# Patient Record
Sex: MALE | Race: WHITE | Employment: FULL TIME | URBAN - METROPOLITAN AREA
[De-identification: names, ages, dates, MRNs, and addresses within clinical notes are randomized per-mention and may not be internally consistent; named-entity substitution may affect disease eponyms.]

---

## 2017-01-27 ENCOUNTER — OFFICE VISIT (OUTPATIENT)
Dept: PULMONOLOGY | Age: 42
End: 2017-01-27

## 2017-01-27 VITALS
TEMPERATURE: 97.5 F | HEART RATE: 85 BPM | WEIGHT: 286.6 LBS | OXYGEN SATURATION: 97 % | BODY MASS INDEX: 42.45 KG/M2 | HEIGHT: 69 IN | RESPIRATION RATE: 16 BRPM | SYSTOLIC BLOOD PRESSURE: 128 MMHG | DIASTOLIC BLOOD PRESSURE: 82 MMHG

## 2017-01-27 DIAGNOSIS — E66.01 OBESITY, CLASS III, BMI 40-49.9 (MORBID OBESITY) (HCC): ICD-10-CM

## 2017-01-27 DIAGNOSIS — R09.81 SINUS CONGESTION: ICD-10-CM

## 2017-01-27 DIAGNOSIS — Z99.89 OSA ON CPAP: Primary | ICD-10-CM

## 2017-01-27 DIAGNOSIS — G47.33 OSA ON CPAP: Primary | ICD-10-CM

## 2017-01-27 PROBLEM — E66.813 OBESITY, CLASS III, BMI 40-49.9 (MORBID OBESITY): Status: ACTIVE | Noted: 2017-01-27

## 2017-01-27 PROCEDURE — 99213 OFFICE O/P EST LOW 20 MIN: CPT | Performed by: NURSE PRACTITIONER

## 2017-01-27 RX ORDER — RANITIDINE 150 MG/1
150 CAPSULE ORAL 2 TIMES DAILY
COMMUNITY
End: 2020-09-16

## 2017-01-27 ASSESSMENT — SLEEP AND FATIGUE QUESTIONNAIRES
HOW LIKELY ARE YOU TO NOD OFF OR FALL ASLEEP WHILE SITTING AND READING: 1
HOW LIKELY ARE YOU TO NOD OFF OR FALL ASLEEP WHILE WATCHING TV: 1
ESS TOTAL SCORE: 11
HOW LIKELY ARE YOU TO NOD OFF OR FALL ASLEEP WHEN YOU ARE A PASSENGER IN A CAR FOR AN HOUR WITHOUT A BREAK: 2
HOW LIKELY ARE YOU TO NOD OFF OR FALL ASLEEP WHILE SITTING INACTIVE IN A PUBLIC PLACE: 2
NECK CIRCUMFERENCE (INCHES): 17.5
HOW LIKELY ARE YOU TO NOD OFF OR FALL ASLEEP IN A CAR, WHILE STOPPED FOR A FEW MINUTES IN TRAFFIC: 0
HOW LIKELY ARE YOU TO NOD OFF OR FALL ASLEEP WHILE LYING DOWN TO REST IN THE AFTERNOON WHEN CIRCUMSTANCES PERMIT: 3
HOW LIKELY ARE YOU TO NOD OFF OR FALL ASLEEP WHILE SITTING AND TALKING TO SOMEONE: 0
HOW LIKELY ARE YOU TO NOD OFF OR FALL ASLEEP WHILE SITTING QUIETLY AFTER LUNCH WITHOUT ALCOHOL: 2

## 2017-07-24 ENCOUNTER — OFFICE VISIT (OUTPATIENT)
Dept: PULMONOLOGY | Age: 42
End: 2017-07-24

## 2017-07-24 VITALS
TEMPERATURE: 97.9 F | OXYGEN SATURATION: 97 % | HEART RATE: 78 BPM | DIASTOLIC BLOOD PRESSURE: 72 MMHG | RESPIRATION RATE: 20 BRPM | SYSTOLIC BLOOD PRESSURE: 130 MMHG | HEIGHT: 69 IN | BODY MASS INDEX: 44.28 KG/M2 | WEIGHT: 299 LBS

## 2017-07-24 DIAGNOSIS — Z99.89 OSA ON CPAP: Primary | ICD-10-CM

## 2017-07-24 DIAGNOSIS — G47.33 OSA ON CPAP: Primary | ICD-10-CM

## 2017-07-24 DIAGNOSIS — E66.01 OBESITY, CLASS III, BMI 40-49.9 (MORBID OBESITY) (HCC): ICD-10-CM

## 2017-07-24 PROCEDURE — 99213 OFFICE O/P EST LOW 20 MIN: CPT | Performed by: NURSE PRACTITIONER

## 2017-07-24 ASSESSMENT — SLEEP AND FATIGUE QUESTIONNAIRES
HOW LIKELY ARE YOU TO NOD OFF OR FALL ASLEEP WHILE SITTING AND READING: 2
HOW LIKELY ARE YOU TO NOD OFF OR FALL ASLEEP IN A CAR, WHILE STOPPED FOR A FEW MINUTES IN TRAFFIC: 0
HOW LIKELY ARE YOU TO NOD OFF OR FALL ASLEEP WHILE WATCHING TV: 2
NECK CIRCUMFERENCE (INCHES): 17.5
HOW LIKELY ARE YOU TO NOD OFF OR FALL ASLEEP WHILE LYING DOWN TO REST IN THE AFTERNOON WHEN CIRCUMSTANCES PERMIT: 3
HOW LIKELY ARE YOU TO NOD OFF OR FALL ASLEEP WHILE SITTING QUIETLY AFTER LUNCH WITHOUT ALCOHOL: 0
HOW LIKELY ARE YOU TO NOD OFF OR FALL ASLEEP WHEN YOU ARE A PASSENGER IN A CAR FOR AN HOUR WITHOUT A BREAK: 2
HOW LIKELY ARE YOU TO NOD OFF OR FALL ASLEEP WHILE SITTING AND TALKING TO SOMEONE: 0
HOW LIKELY ARE YOU TO NOD OFF OR FALL ASLEEP WHILE SITTING INACTIVE IN A PUBLIC PLACE: 0
ESS TOTAL SCORE: 9

## 2018-07-30 ENCOUNTER — OFFICE VISIT (OUTPATIENT)
Dept: PULMONOLOGY | Age: 43
End: 2018-07-30

## 2018-07-30 VITALS
OXYGEN SATURATION: 98 % | WEIGHT: 309 LBS | DIASTOLIC BLOOD PRESSURE: 76 MMHG | TEMPERATURE: 98 F | HEIGHT: 69 IN | SYSTOLIC BLOOD PRESSURE: 134 MMHG | BODY MASS INDEX: 45.77 KG/M2 | RESPIRATION RATE: 16 BRPM | HEART RATE: 75 BPM

## 2018-07-30 DIAGNOSIS — G47.33 OSA ON CPAP: Primary | ICD-10-CM

## 2018-07-30 DIAGNOSIS — Z99.89 OSA ON CPAP: Primary | ICD-10-CM

## 2018-07-30 DIAGNOSIS — I49.3 PVC'S (PREMATURE VENTRICULAR CONTRACTIONS): ICD-10-CM

## 2018-07-30 DIAGNOSIS — E66.01 OBESITY, CLASS III, BMI 40-49.9 (MORBID OBESITY) (HCC): ICD-10-CM

## 2018-07-30 PROCEDURE — 99213 OFFICE O/P EST LOW 20 MIN: CPT | Performed by: NURSE PRACTITIONER

## 2018-07-30 ASSESSMENT — SLEEP AND FATIGUE QUESTIONNAIRES
ESS TOTAL SCORE: 11
HOW LIKELY ARE YOU TO NOD OFF OR FALL ASLEEP WHILE SITTING INACTIVE IN A PUBLIC PLACE: 1
HOW LIKELY ARE YOU TO NOD OFF OR FALL ASLEEP IN A CAR, WHILE STOPPED FOR A FEW MINUTES IN TRAFFIC: 0
HOW LIKELY ARE YOU TO NOD OFF OR FALL ASLEEP WHILE LYING DOWN TO REST IN THE AFTERNOON WHEN CIRCUMSTANCES PERMIT: 3
HOW LIKELY ARE YOU TO NOD OFF OR FALL ASLEEP WHEN YOU ARE A PASSENGER IN A CAR FOR AN HOUR WITHOUT A BREAK: 2
NECK CIRCUMFERENCE (INCHES): 18.5
HOW LIKELY ARE YOU TO NOD OFF OR FALL ASLEEP WHILE SITTING AND READING: 2
ESS TOTAL SCORE: 14
HOW LIKELY ARE YOU TO NOD OFF OR FALL ASLEEP WHILE SITTING AND TALKING TO SOMEONE: 1
HOW LIKELY ARE YOU TO NOD OFF OR FALL ASLEEP WHILE SITTING INACTIVE IN A PUBLIC PLACE: 0
HOW LIKELY ARE YOU TO NOD OFF OR FALL ASLEEP WHILE SITTING QUIETLY AFTER LUNCH WITHOUT ALCOHOL: 2
HOW LIKELY ARE YOU TO NOD OFF OR FALL ASLEEP WHILE SITTING AND READING: 2
HOW LIKELY ARE YOU TO NOD OFF OR FALL ASLEEP WHILE WATCHING TV: 2
HOW LIKELY ARE YOU TO NOD OFF OR FALL ASLEEP WHILE SITTING AND TALKING TO SOMEONE: 0
HOW LIKELY ARE YOU TO NOD OFF OR FALL ASLEEP IN A CAR, WHILE STOPPED FOR A FEW MINUTES IN TRAFFIC: 1
HOW LIKELY ARE YOU TO NOD OFF OR FALL ASLEEP WHILE LYING DOWN TO REST IN THE AFTERNOON WHEN CIRCUMSTANCES PERMIT: 3
HOW LIKELY ARE YOU TO NOD OFF OR FALL ASLEEP WHILE SITTING QUIETLY AFTER LUNCH WITHOUT ALCOHOL: 2
HOW LIKELY ARE YOU TO NOD OFF OR FALL ASLEEP WHEN YOU ARE A PASSENGER IN A CAR FOR AN HOUR WITHOUT A BREAK: 2
HOW LIKELY ARE YOU TO NOD OFF OR FALL ASLEEP WHILE WATCHING TV: 2

## 2018-07-30 NOTE — PATIENT INSTRUCTIONS
Uncomfortable bedding can prevent good sleep. Ensure your bedroom is quiet and comfortable. A cooler room along with enough blankets to stay warm is recommended. If your room is too noisy, try a white noise machine. If too bright, try black out shades or an eye mask. Dont take worries to bed. Leave worries about work, school etc. behind you when you go to bed. Some people find it helpful to assign a worry period in the evening or late afternoon to write down your worries and get them out of your system. CPAP Equipment Cleaning and Disinfecting Schedule  Equipment Cleaning Frequency Instructions  Disinfecting Frequency   Non-Disposable Filters  Weekly Mild soapy water, Rinse, Air Dry Not Required   Disposable Filters Change as needed  2-4 weeks Do Not Wash Not Required   Hose/tubing Daily Mild soapy water, Rinse, Air Dry Once a week   Mask / Nasal Pillows Daily Mild soapy water, Rinse, Air Dry Once a week   Headgear Weekly Hand wash, Mild soapy water, Rinse, Dry  Not Required   Humidifier Daily Empty water daily  Mild soapy water, Rinse well, Air Dry  Once a week   CPAP Unit As Needed Dust with damp cloth,  No detergents or sprays Not Required         Disinfect (per schedule) with 1 part white vinegar and 3 parts water- soak mask and water chamber for 30 minutes every 1-2 weeks, more often if sick. Allow water/vinegar mixture to run through tubing. Allow all equipment to air dry. Drying Hints:   Always hang tubing away from direct sunlight, as this will cause the tubing to become yellow, brittle and crack over a period of time. DO NOT attach the wet tubing to your CPAP unit to blow-dry it. The moisture from the tubing can drain back into your machine. Moisture in your unit can cause sudden pressure increases or short circuits  DO's and DON'Ts:  - Don't use alcohol-based products to clean your mask, because it can cause the materials to become hard and brittle.    - Don't put headgear in the washer or varenicline and you have any new or worsening depressed mood or thoughts of harming yourself or someone else, stop taking the medicine and call your doctor. Buproprion should not be taken by anyone with a history of seizure or epilepsy. Will I gain weight if I quit?  Yes, you might gain a few pounds. But quitting smoking will have a much more positive effect on your health than weighing a few pounds more. Plus, you can help prevent some weight gain by being more active and eating less. Taking the medicine bupropion might help control weight gain. What else can I do to improve my chances of quitting?  You can:  ?Start exercising. ?Stay away from smokers and places that you associate with smoking. If people close to you smoke, ask them to quit with you. ? Keep gum, hard candy, or something to put in your mouth handy. If you get a craving for a cigarette, try one of these instead. ?Dont give up, even if you start smoking again. It takes most people a few tries before they succeed. You can also get help from a free phone line (4-008-QUIT-NOW) or go online to www.smokefree.gov. Your pulmonary team is here to help you quit.   Call for assistance [phone]

## 2018-07-30 NOTE — PROGRESS NOTES
MA Communication: The following orders are received by verbal communication from JULIETTE Ash.     Orders include:  Order faxed to [facility]       Cardiology referral given       1 year fu scheduled 7/29/19

## 2019-07-29 ENCOUNTER — TELEPHONE (OUTPATIENT)
Dept: PULMONOLOGY | Age: 44
End: 2019-07-29

## 2019-08-12 ENCOUNTER — OFFICE VISIT (OUTPATIENT)
Dept: PULMONOLOGY | Age: 44
End: 2019-08-12
Payer: COMMERCIAL

## 2019-08-12 VITALS
BODY MASS INDEX: 41.67 KG/M2 | RESPIRATION RATE: 16 BRPM | HEIGHT: 73 IN | HEART RATE: 98 BPM | TEMPERATURE: 97.7 F | OXYGEN SATURATION: 97 % | SYSTOLIC BLOOD PRESSURE: 135 MMHG | DIASTOLIC BLOOD PRESSURE: 76 MMHG | WEIGHT: 314.4 LBS

## 2019-08-12 DIAGNOSIS — E66.01 OBESITY, CLASS III, BMI 40-49.9 (MORBID OBESITY) (HCC): ICD-10-CM

## 2019-08-12 DIAGNOSIS — Z99.89 OSA ON CPAP: Primary | ICD-10-CM

## 2019-08-12 DIAGNOSIS — Z71.89 CPAP USE COUNSELING: ICD-10-CM

## 2019-08-12 DIAGNOSIS — G47.33 OSA ON CPAP: Primary | ICD-10-CM

## 2019-08-12 PROCEDURE — 4004F PT TOBACCO SCREEN RCVD TLK: CPT | Performed by: NURSE PRACTITIONER

## 2019-08-12 PROCEDURE — 99213 OFFICE O/P EST LOW 20 MIN: CPT | Performed by: NURSE PRACTITIONER

## 2019-08-12 PROCEDURE — G8417 CALC BMI ABV UP PARAM F/U: HCPCS | Performed by: NURSE PRACTITIONER

## 2019-08-12 PROCEDURE — G8427 DOCREV CUR MEDS BY ELIG CLIN: HCPCS | Performed by: NURSE PRACTITIONER

## 2019-08-12 ASSESSMENT — SLEEP AND FATIGUE QUESTIONNAIRES
HOW LIKELY ARE YOU TO NOD OFF OR FALL ASLEEP IN A CAR, WHILE STOPPED FOR A FEW MINUTES IN TRAFFIC: 0
HOW LIKELY ARE YOU TO NOD OFF OR FALL ASLEEP WHILE LYING DOWN TO REST IN THE AFTERNOON WHEN CIRCUMSTANCES PERMIT: 2
HOW LIKELY ARE YOU TO NOD OFF OR FALL ASLEEP WHILE SITTING AND TALKING TO SOMEONE: 0
HOW LIKELY ARE YOU TO NOD OFF OR FALL ASLEEP WHILE SITTING INACTIVE IN A PUBLIC PLACE: 0
HOW LIKELY ARE YOU TO NOD OFF OR FALL ASLEEP WHILE SITTING AND READING: 1
HOW LIKELY ARE YOU TO NOD OFF OR FALL ASLEEP WHILE SITTING QUIETLY AFTER LUNCH WITHOUT ALCOHOL: 0
HOW LIKELY ARE YOU TO NOD OFF OR FALL ASLEEP WHILE WATCHING TV: 2
HOW LIKELY ARE YOU TO NOD OFF OR FALL ASLEEP WHEN YOU ARE A PASSENGER IN A CAR FOR AN HOUR WITHOUT A BREAK: 0
NECK CIRCUMFERENCE (INCHES): 19.5
ESS TOTAL SCORE: 5

## 2019-08-12 NOTE — PROGRESS NOTES
(PRINIVIL;ZESTRIL) 20 MG tablet, Take 1 tablet by mouth daily (Patient taking differently: Take 10 mg by mouth daily ), Disp: 90 tablet, Rfl: 0    metoprolol (LOPRESSOR) 25 MG tablet, Take 1 tablet by mouth 2 times daily. (Patient taking differently: Take 12.5 mg by mouth 2 times daily ), Disp: 180 tablet, Rfl: 1    naproxen (NAPROSYN) 375 MG tablet, Take 1 tablet by mouth 2 times daily for 10 days, Disp: 20 tablet, Rfl: 0      Objective:   PHYSICAL EXAM:    Blood pressure 135/76, pulse 98, temperature 97.7 °F (36.5 °C), temperature source Oral, resp. rate 16, height 6' 1\" (1.854 m), weight (!) 314 lb 6.4 oz (142.6 kg), SpO2 97 %.' on RA  Gen: No acute distress. Obese. BMI of 41.48  Eyes: PERRL. No sclera icterus. No conjunctival injection. ENT: No discharge. Pharynx clear. Mallampati class IV. Large tongue, tonsillar hypertrophy  Neck: Trachea midline. No obvious mass. Neck circumference 19.5\"  Resp: No accessory muscle use. No crackles. No wheezes. No rhonchi. CV: Regular rate. Regular rhythm. No murmur or rub. Skin: Warm and dry. M/S: No cyanosis. No obvious joint deformity. Neuro: Awake. Alert. Moves all four extremities. Psych: Oriented x 3. No anxiety. DATA:   PSG 5/1/16- AHI 86.7, low spo2 74%, EKG NSR with PVCs  CPAP titration 5/23/16- Improved sleep related breathing with CPAP, rec CPAP 10 cm H2O, EKG with PVCs    CPAP compliance data:  Compliance download report from 6/24/17 to 7/23/17 showed patient is using machine 6:27 hrs/night with 90% compliance and AHI 0.6 within this time frame. 27/30days with greater than 4 hours of machine use. CPAP 10 cm H20    Compliance download report from 6/30/18 to 7/29/18 showed patient is using machine 6:28 hrs/night with 96% compliance and AHI 0.7 within this time frame. 29/30days with greater than 4 hours of machine use.  CPAP 10 cm H20    Compliance download report from 7/13/19 to 8/11/19 reviewed today by me and showed patient is using machine 7:52 hrs/night with 100% compliance and AHI 0.7 within this time frame. 30/30days with greater than 4 hours of machine use. CPAP 10 cm H20    Assessment:       · Severe SAAD- CPAP 10 cm H2O- Optimal compliance and efficacy on review today. · Snoring- resolved on CPAP  · Witnessed apnea -resolved on CPAP  · Hypersomnia-improving on CPAP  · PVCs on PSG and titration EKGs- has not seen cardiology  · Seasonal allergies  · HTN      Plan:         · Continue CPAP 10 cm H2O  · Advised to use CPAP 7-8 hrs at night and during naps. · Discussed severity of SAAD and importance of CPAP use  · Send order for new supplies  · Replacement of mask, tubing, head straps every 3-6 months or sooner if damaged. · Patient instructed to contact [employer] company for any mask, tubing or machine trouble shooting if problems arise. · Sleep hygiene  · Avoid sedatives, alcohol and caffeinated drinks at bed time. · No driving motorized vehicles or operating heavy machinery while fatigue, drowsy or sleepy. · Weight loss is also recommended as a long-term intervention. · Complications of SAAD if not treated were discussed with patient patient to include systemic hypertension, pulmonary hypertension, cardiovascular morbidities, car accidents and all cause mortality. · Patient education handout provided regarding sleep tips and CPAP cleaning recommendations   · Please follow up with cardiology for EKG with PVCs on PSG and titration    Follow up 1 year sooner if needed.

## 2019-08-12 NOTE — PATIENT INSTRUCTIONS
awakening time, Even on weekends! You will feel better keeping a regular sleep cycle, even if you are retired or not working. Get into your favorite sleep position. If not asleep in 30 minutes, get up and do something boring until you feel sleepy. Remember not to expose yourself to bright lights such as TV, phone or tablet screens. Only use your bed for sleeping. Do not use your bed as an office, workroom or recreation room. Use comfortable bedding. Uncomfortable bedding can prevent good sleep. Ensure your bedroom is quiet and comfortable. A cooler room along with enough blankets to stay warm is recommended. If your room is too noisy, try a white noise machine. If too bright, try black out shades or an eye mask. Dont take worries to bed. Leave worries about work, school etc. behind you when you go to bed. Some people find it helpful to assign a worry period in the evening or late afternoon to write down your worries and get them out of your system. CPAP Equipment Cleaning and Disinfecting Schedule  Equipment Cleaning Frequency Instructions  Disinfecting Frequency   Non-Disposable Filters  Weekly Mild soapy water, Rinse, Air Dry Not Required   Disposable Filters Change as needed  2-4 weeks Do Not Wash Not Required   Hose/tubing Daily Mild soapy water, Rinse, Air Dry Once a week   Mask / Nasal Pillows Daily Mild soapy water, Rinse, Air Dry Once a week   Headgear Weekly Hand wash, Mild soapy water, Rinse, Dry  Not Required   Humidifier Daily Empty water daily  Mild soapy water, Rinse well, Air Dry  Once a week   CPAP Unit As Needed Dust with damp cloth,  No detergents or sprays Not Required         Disinfect (per schedule) with 1 part white vinegar and 3 parts water- soak mask and water chamber for 30 minutes every 1-2 weeks, more often if sick. Allow water/vinegar mixture to run through tubing. Allow all equipment to air dry.    Drying Hints:   Always hang tubing away from direct sunlight, as this

## 2020-08-17 ENCOUNTER — TELEPHONE (OUTPATIENT)
Dept: PULMONOLOGY | Age: 45
End: 2020-08-17

## 2020-08-24 ENCOUNTER — TELEPHONE (OUTPATIENT)
Dept: PULMONOLOGY | Age: 45
End: 2020-08-24

## 2020-08-24 NOTE — TELEPHONE ENCOUNTER

## 2020-08-29 ENCOUNTER — HOSPITAL ENCOUNTER (OUTPATIENT)
Dept: ULTRASOUND IMAGING | Age: 45
Discharge: HOME | End: 2020-08-29
Payer: COMMERCIAL

## 2020-08-29 PROCEDURE — 76705 ECHO EXAM OF ABDOMEN: CPT

## 2020-09-16 ENCOUNTER — TELEPHONE (OUTPATIENT)
Dept: PULMONOLOGY | Age: 45
End: 2020-09-16

## 2020-09-16 ENCOUNTER — VIRTUAL VISIT (OUTPATIENT)
Dept: PULMONOLOGY | Age: 45
End: 2020-09-16
Payer: COMMERCIAL

## 2020-09-16 PROCEDURE — 99213 OFFICE O/P EST LOW 20 MIN: CPT | Performed by: NURSE PRACTITIONER

## 2020-09-16 RX ORDER — DULAGLUTIDE 0.75 MG/.5ML
INJECTION, SOLUTION SUBCUTANEOUS
COMMUNITY
Start: 2020-09-01 | End: 2021-07-07

## 2020-09-16 RX ORDER — FAMOTIDINE 20 MG/1
20 TABLET, FILM COATED ORAL 2 TIMES DAILY
COMMUNITY

## 2020-09-16 ASSESSMENT — SLEEP AND FATIGUE QUESTIONNAIRES
ESS TOTAL SCORE: 9
HOW LIKELY ARE YOU TO NOD OFF OR FALL ASLEEP WHILE SITTING INACTIVE IN A PUBLIC PLACE: 0
HOW LIKELY ARE YOU TO NOD OFF OR FALL ASLEEP WHILE SITTING QUIETLY AFTER LUNCH WITHOUT ALCOHOL: 2
HOW LIKELY ARE YOU TO NOD OFF OR FALL ASLEEP WHILE SITTING AND TALKING TO SOMEONE: 0
HOW LIKELY ARE YOU TO NOD OFF OR FALL ASLEEP WHILE WATCHING TV: 2
HOW LIKELY ARE YOU TO NOD OFF OR FALL ASLEEP WHEN YOU ARE A PASSENGER IN A CAR FOR AN HOUR WITHOUT A BREAK: 0
HOW LIKELY ARE YOU TO NOD OFF OR FALL ASLEEP WHILE SITTING AND READING: 2
HOW LIKELY ARE YOU TO NOD OFF OR FALL ASLEEP WHILE LYING DOWN TO REST IN THE AFTERNOON WHEN CIRCUMSTANCES PERMIT: 3
HOW LIKELY ARE YOU TO NOD OFF OR FALL ASLEEP IN A CAR, WHILE STOPPED FOR A FEW MINUTES IN TRAFFIC: 0

## 2020-09-16 NOTE — PATIENT INSTRUCTIONS
Here are some tips to to getting better sleep  1- Avoid napping during the day: This will ensure you are tired at bedtime. If you have to take a nap, sleep less than one hour, before 3 pm.   2- Exercise regularly, but not right before bed: but the timing of the workout is important. Exercising in the morning or early afternoon will not interfere with sleep. Exercising within two hours before bedtime can decrease your ability to fall asleep. Regular exercise is recommended to help you deepen the sleep. 3- Avoid heavy, spicy, or sugary foods 4-6 hours before bedtime: These can affect your ability to stay asleep. 4- Have a light snack before bed: Having an empty stomach can interfere with your sleep. Dairy products and turkey contain tryptophan, which acts as a natural sleep inducer. 5- Stay away from caffeine, nicotine and alcohol at least 4-6 hours before bed: Caffeine and nicotine are stimulants that interfere with your ability to fall asleep. While alcohol has an immediate sleep-inducing effect, a few hours later, as alcohol levels in your blood start to fall, there is a stimulant effect and you will experience fragmented sleep. 6- Take a hot bath 90 minutes before bedtime:  A hot bath will raise your body temperature, but it is the drop in body temperature that may leave you feeling sleepy  7- Develop sleep rituals: it is important to give your body cues that it is time to slow down and sleep. Listen to relaxing music, read something soothing for 15 minutes, have a cup of caffeine free tea, or do relaxation exercises such as yoga or deep breathing help relieve anxiety and reduce muscle tension. 8- Fix a bedtime and an awakening time: Even on weekends! When your sleep cycle has a regular rhythm, you will feel better. 9- Sleep only when sleepy: This reduces the time you are awake in bed.    10- Get into your favorite sleeping position: If you can't fall asleep within 15-30 minutes, get up and do chamber for 30 minutes every 1-2 weeks, more often if sick. Allow water/vinegar mixture to run through tubing. Allow all equipment to air dry. Drying Hints:   Always hang tubing away from direct sunlight, as this will cause the tubing to become yellow, brittle and crack over a period of time. DO NOT attach the wet tubing to your CPAP unit to blow-dry it. The moisture from the tubing can drain back into your machine. Moisture in your unit can cause sudden pressure increases or short circuits  DO's and DON'Ts:  - Don't use alcohol-based products to clean your mask, because it can cause the materials to become hard and brittle. - Don't put headgear in the washer or dryer  - Don't use any caustic or household cleaning solutions such as bleach on your CPAP   equipment.  - Do follow the recommended cleaning schedule. - Do change your disposable filter frequently. Adapted From: [employer]/cleaning. shtm.   These are general suggestions for all models please follow specific s recommendations and specific instructions

## 2020-09-16 NOTE — PROGRESS NOTES
Orders verbalized by Natalie Sims CNP;  1 yr  Orders faxed to DME:[facility] (Pt taking machine to [facility] for download)  Phone note

## 2020-09-16 NOTE — PROGRESS NOTES
CHIEF COMPLAINT: Sleep apnea follow up  Consulting provider: Dr. Chadwick Powers is a 45 y.o. male for televideo appointment via video and audio doxy. me virtual visit for SAAD follow up. STates he is doing okay with CPAP. Has been a little more tired lately but states also just diagnosed with diabetes and getting medications figured out. Patient is using CPAP 8 hrs/night. Using humidifier. No snoring on CPAP. The pressure is well tolerated. The mask is comfortable-full face arely view. No mask leak. Some daytime sleepiness. No nodding off when driving. Rare dry nose or throat. Some fatigue. Bedtime is 8-9 pm and rise time is 430 am. Sleep onset is few-10 minutes. Wakes up 1-2 times at night total. 1-2 nocturia. It takes few minutes to fall back a sleep. Occasional naps during the day. No headache in am. No weight gain. 1 caffienated beverages during the day. No alcohol. ESS is 9. Past Medical History:   Diagnosis Date    Hypertension     Kidney stones     MRSA (methicillin resistant Staphylococcus aureus) 12/10/12    SAAD (obstructive sleep apnea)        Past Surgical History:        Procedure Laterality Date    OTHER SURGICAL HISTORY  12/13/12    Wide Excision of Nonhealing Cysts Right Axilla and Bilateral Upper Inner Thighs    OTHER SURGICAL HISTORY  09/25/2013    cystoscopy, right retrograde, ureteroscopy, Holmium Laser lithotripsy, stone extraction       Allergies:  is allergic to amoxicillin; erythromycin; and pcn [penicillins]. Social History:    TOBACCO:   reports that he has been smoking cigarettes. He has a 10.00 pack-year smoking history. He has quit using smokeless tobacco.  His smokeless tobacco use included chew. ETOH:   reports current alcohol use.       Family History:       Problem Relation Age of Onset    Diabetes Mother     Asthma Father     COPD Father        Current Medications:    Current Outpatient Medications:     famotidine (PEPCID) 20 MG tablet, Take 20 mg by mouth 2 times daily, Disp: , Rfl:     TRULICITY 0.00 OM/7.9YJ SOPN, , Disp: , Rfl:     metFORMIN (GLUCOPHAGE) 1000 MG tablet, , Disp: , Rfl:     lisinopril (PRINIVIL;ZESTRIL) 20 MG tablet, Take 1 tablet by mouth daily (Patient taking differently: Take 10 mg by mouth daily ), Disp: 90 tablet, Rfl: 0    metoprolol (LOPRESSOR) 25 MG tablet, Take 1 tablet by mouth 2 times daily. (Patient taking differently: Take 12.5 mg by mouth 2 times daily ), Disp: 180 tablet, Rfl: 1    naproxen (NAPROSYN) 375 MG tablet, Take 1 tablet by mouth 2 times daily for 10 days, Disp: 20 tablet, Rfl: 0      Objective:   PHYSICAL EXAM:    There were no vitals taken for this visit.' on RA  Exam:  Gen: No acute distress, does not appear to be in pain. Appears well developed and nourished. HENT: Head is normocephalic and atraumatic. Normal appearing nose. External Ears normal.   Neck: No visualized mass. Trachea is midline   Eyes: EOM intact. No visible discharge. Resp:No visualized signs of difficulty breathing or respiratory distress, speaking in full sentences. Respiratory effort normal.  Neuro: Awake. Alert. Able to follow commands. No facial asymmetry. Psych: Oriented x 3. No anxiety. Normal affect. DATA:   PSG 5/1/16- AHI 86.7, low spo2 74%, EKG NSR with PVCs  CPAP titration 5/23/16- Improved sleep related breathing with CPAP, rec CPAP 10 cm H2O, EKG with PVCs    CPAP compliance data:  Compliance download report from 6/24/17 to 7/23/17 showed patient is using machine 6:27 hrs/night with 90% compliance and AHI 0.6 within this time frame. 27/30days with greater than 4 hours of machine use. CPAP 10 cm H20    Compliance download report from 6/30/18 to 7/29/18 showed patient is using machine 6:28 hrs/night with 96% compliance and AHI 0.7 within this time frame. 29/30days with greater than 4 hours of machine use.  CPAP 10 cm H20    Compliance download report from 7/13/19 to 8/11/19 reviewed today by me and showed patient is using machine 7:52 hrs/night with 100% compliance and AHI 0.7 within this time frame. 30/30days with greater than 4 hours of machine use. CPAP 10 cm H20    9/16/2020-unable to obtain CPAP download report    Assessment:       · Severe SAAD- CPAP 10 cm H2O-compliant per patient  · Snoring- resolved on CPAP  · Witnessed apnea -resolved on CPAP  · Hypersomnia-improving on CPAP, a little worse recently  · Seasonal allergies  · HTN      Plan:         · Continue CPAP 10 cm H2O for now, review report- could consider increase in pressure if needed as patient has had some increased fatigue recently. · Request CPAP download report from DME. · Advised to use CPAP 7-8 hrs at night and during naps. · Discussed severity of SAAD and importance of CPAP use  · Send order for new supplies  · Replacement of mask, tubing, head straps every 3-6 months or sooner if damaged. · Patient instructed to contact DME company for any mask, tubing or machine trouble shooting if problems arise. · Sleep hygiene  · Avoid sedatives, alcohol and caffeinated drinks at bed time. · No driving motorized vehicles or operating heavy machinery while fatigue, drowsy or sleepy. · Weight loss is also recommended as a long-term intervention. · Complications of SAAD if not treated were discussed with patient patient to include systemic hypertension, pulmonary hypertension, cardiovascular morbidities, car accidents and all cause mortality. · Patient education handout provided regarding sleep tips and CPAP cleaning recommendations     Follow up 1 year sooner if needed. Consent for telehealth visit was obtained and is noted in chart      THIS VISIT WAS COMPLETED VIRTUALLY USING DOXY. Jory Perdomo is a 45 y.o. male being evaluated by a Virtual Visit (video visit) encounter to address concerns as mentioned above. A caregiver was present when appropriate.  Due to this being a TeleHealth encounter (During ITPJX-56 public health emergency), evaluation of the following organ systems was limited: Vitals/Constitutional/EENT/Resp/CV/GI//MS/Neuro/Skin/Heme-Lymph-Imm. Pursuant to the emergency declaration under the [address] [place] [address], [address] [place] [address] authority and the [employer] and [employer] Act, this Virtual Visit was conducted with patient's (and/or legal guardian's) consent, to reduce the patient's risk of exposure to COVID-19 and provide necessary medical care. The patient (and/or legal guardian) has also been advised to contact this office for worsening conditions or problems, and seek emergency medical treatment and/or call 911 if deemed necessary. Patient identification was verified at the start of the visit: Yes    Total time spent for this encounter: Not billed by time    Services were provided through a video synchronous discussion virtually to substitute for in-person clinic visit. Patient and provider were located at their individual homes. --RAJINDER Felix CNP on 9/16/2020 at 11:48 AM    An electronic signature was used to authenticate this note.

## 2020-09-17 ENCOUNTER — APPOINTMENT (OUTPATIENT)
Dept: CT IMAGING | Age: 45
End: 2020-09-17
Payer: COMMERCIAL

## 2020-09-17 ENCOUNTER — HOSPITAL ENCOUNTER (EMERGENCY)
Age: 45
Discharge: HOME | End: 2020-09-18
Payer: COMMERCIAL

## 2020-09-17 LAB
A/G RATIO: 2 (ref 1.1–2.2)
ALBUMIN SERPL-MCNC: 4.5 G/DL (ref 3.4–5)
ALP BLD-CCNC: 89 U/L (ref 40–129)
ALT SERPL-CCNC: 53 U/L (ref 10–40)
ANION GAP SERPL CALCULATED.3IONS-SCNC: 12 MMOL/L (ref 3–16)
AST SERPL-CCNC: 25 U/L (ref 15–37)
BASOPHILS ABSOLUTE: 0.1 K/UL (ref 0–0.2)
BASOPHILS RELATIVE PERCENT: 0.7 %
BILIRUB SERPL-MCNC: 0.3 MG/DL (ref 0–1)
BUN BLDV-MCNC: 10 MG/DL (ref 7–20)
CALCIUM SERPL-MCNC: 9.3 MG/DL (ref 8.3–10.6)
CHLORIDE BLD-SCNC: 102 MMOL/L (ref 99–110)
CO2: 23 MMOL/L (ref 21–32)
CREAT SERPL-MCNC: 0.9 MG/DL (ref 0.9–1.3)
EOSINOPHILS ABSOLUTE: 0.2 K/UL (ref 0–0.6)
EOSINOPHILS RELATIVE PERCENT: 1.4 %
GFR AFRICAN AMERICAN: >60
GFR NON-AFRICAN AMERICAN: >60
GLOBULIN: 2.2 G/DL
GLUCOSE BLD-MCNC: 183 MG/DL (ref 70–99)
HCT VFR BLD CALC: 42.2 % (ref 40.5–52.5)
HEMOGLOBIN: 14 G/DL (ref 13.5–17.5)
LYMPHOCYTES ABSOLUTE: 2.3 K/UL (ref 1–5.1)
LYMPHOCYTES RELATIVE PERCENT: 16.7 %
MCH RBC QN AUTO: 29.3 PG (ref 26–34)
MCHC RBC AUTO-ENTMCNC: 33.2 G/DL (ref 31–36)
MCV RBC AUTO: 88.1 FL (ref 80–100)
MONOCYTES ABSOLUTE: 0.6 K/UL (ref 0–1.3)
MONOCYTES RELATIVE PERCENT: 4.4 %
NEUTROPHILS ABSOLUTE: 10.4 K/UL (ref 1.7–7.7)
NEUTROPHILS RELATIVE PERCENT: 76.8 %
PDW BLD-RTO: 13.9 % (ref 12.4–15.4)
PLATELET # BLD: 355 K/UL (ref 135–450)
PMV BLD AUTO: 7.6 FL (ref 5–10.5)
POTASSIUM REFLEX MAGNESIUM: 4 MMOL/L (ref 3.5–5.1)
RBC # BLD: 4.79 M/UL (ref 4.2–5.9)
SODIUM BLD-SCNC: 137 MMOL/L (ref 136–145)
TOTAL PROTEIN: 6.7 G/DL (ref 6.4–8.2)
WBC # BLD: 13.6 K/UL (ref 4–11)

## 2020-09-17 PROCEDURE — 96375 TX/PRO/DX INJ NEW DRUG ADDON: CPT

## 2020-09-17 PROCEDURE — 99284 EMERGENCY DEPT VISIT MOD MDM: CPT

## 2020-09-17 PROCEDURE — 2580000003 HC RX 258: Performed by: PHYSICIAN ASSISTANT

## 2020-09-17 PROCEDURE — 96374 THER/PROPH/DIAG INJ IV PUSH: CPT

## 2020-09-17 PROCEDURE — 74176 CT ABD & PELVIS W/O CONTRAST: CPT

## 2020-09-17 PROCEDURE — 87086 URINE CULTURE/COLONY COUNT: CPT

## 2020-09-17 PROCEDURE — 6360000002 HC RX W HCPCS: Performed by: PHYSICIAN ASSISTANT

## 2020-09-17 PROCEDURE — 85025 COMPLETE CBC W/AUTO DIFF WBC: CPT

## 2020-09-17 PROCEDURE — 80053 COMPREHEN METABOLIC PANEL: CPT

## 2020-09-17 PROCEDURE — 81001 URINALYSIS AUTO W/SCOPE: CPT

## 2020-09-17 RX ORDER — KETOROLAC TROMETHAMINE 30 MG/ML
15 INJECTION, SOLUTION INTRAMUSCULAR; INTRAVENOUS ONCE
Status: COMPLETED | OUTPATIENT
Start: 2020-09-17 | End: 2020-09-17

## 2020-09-17 RX ORDER — 0.9 % SODIUM CHLORIDE 0.9 %
1000 INTRAVENOUS SOLUTION INTRAVENOUS ONCE
Status: COMPLETED | OUTPATIENT
Start: 2020-09-17 | End: 2020-09-18

## 2020-09-17 RX ORDER — ONDANSETRON 2 MG/ML
4 INJECTION INTRAMUSCULAR; INTRAVENOUS ONCE
Status: COMPLETED | OUTPATIENT
Start: 2020-09-17 | End: 2020-09-17

## 2020-09-17 RX ADMIN — SODIUM CHLORIDE 1000 ML: 9 INJECTION, SOLUTION INTRAVENOUS at 23:43

## 2020-09-17 RX ADMIN — ONDANSETRON HYDROCHLORIDE 4 MG: 2 INJECTION, SOLUTION INTRAMUSCULAR; INTRAVENOUS at 23:44

## 2020-09-17 RX ADMIN — KETOROLAC TROMETHAMINE 15 MG: 30 INJECTION, SOLUTION INTRAMUSCULAR at 23:43

## 2020-09-17 ASSESSMENT — PAIN DESCRIPTION - DESCRIPTORS: DESCRIPTORS: SHARP

## 2020-09-17 ASSESSMENT — PAIN DESCRIPTION - FREQUENCY: FREQUENCY: INTERMITTENT

## 2020-09-17 ASSESSMENT — PAIN SCALES - GENERAL
PAINLEVEL_OUTOF10: 6
PAINLEVEL_OUTOF10: 6

## 2020-09-17 ASSESSMENT — PAIN DESCRIPTION - LOCATION: LOCATION: FLANK

## 2020-09-17 ASSESSMENT — PAIN DESCRIPTION - PAIN TYPE: TYPE: ACUTE PAIN

## 2020-09-17 ASSESSMENT — PAIN DESCRIPTION - ORIENTATION: ORIENTATION: RIGHT

## 2020-09-18 VITALS
DIASTOLIC BLOOD PRESSURE: 75 MMHG | OXYGEN SATURATION: 99 % | TEMPERATURE: 97.8 F | HEART RATE: 82 BPM | WEIGHT: 295 LBS | BODY MASS INDEX: 42.23 KG/M2 | SYSTOLIC BLOOD PRESSURE: 137 MMHG | RESPIRATION RATE: 16 BRPM | HEIGHT: 70 IN

## 2020-09-18 LAB
BACTERIA: ABNORMAL /HPF
BILIRUBIN URINE: NEGATIVE
BLOOD, URINE: ABNORMAL
CLARITY: CLEAR
COLOR: YELLOW
GLUCOSE URINE: NEGATIVE MG/DL
KETONES, URINE: NEGATIVE MG/DL
LEUKOCYTE ESTERASE, URINE: NEGATIVE
MICROSCOPIC EXAMINATION: YES
MUCUS: ABNORMAL /LPF
NITRITE, URINE: NEGATIVE
PH UA: 6 (ref 5–8)
PROTEIN UA: ABNORMAL MG/DL
RBC UA: >100 /HPF (ref 0–4)
SPECIFIC GRAVITY UA: >=1.03 (ref 1–1.03)
URINE CULTURE, ROUTINE: NORMAL
URINE TYPE: ABNORMAL
UROBILINOGEN, URINE: 0.2 E.U./DL
WBC UA: ABNORMAL /HPF (ref 0–5)

## 2020-09-18 RX ORDER — TAMSULOSIN HYDROCHLORIDE 0.4 MG/1
0.4 CAPSULE ORAL DAILY
Qty: 5 CAPSULE | Refills: 0 | Status: SHIPPED | OUTPATIENT
Start: 2020-09-18 | End: 2021-07-07

## 2020-09-18 RX ORDER — ONDANSETRON 4 MG/1
4 TABLET, ORALLY DISINTEGRATING ORAL EVERY 8 HOURS PRN
Qty: 20 TABLET | Refills: 0 | Status: SHIPPED | OUTPATIENT
Start: 2020-09-18 | End: 2021-07-07

## 2020-09-18 RX ORDER — IBUPROFEN 800 MG/1
800 TABLET ORAL EVERY 8 HOURS PRN
Qty: 20 TABLET | Refills: 0 | Status: SHIPPED | OUTPATIENT
Start: 2020-09-18 | End: 2021-07-07

## 2020-09-18 RX ORDER — HYDROCODONE BITARTRATE AND ACETAMINOPHEN 5; 325 MG/1; MG/1
1 TABLET ORAL EVERY 8 HOURS PRN
Qty: 9 TABLET | Refills: 0 | Status: SHIPPED | OUTPATIENT
Start: 2020-09-18 | End: 2020-09-21

## 2020-09-18 ASSESSMENT — PAIN DESCRIPTION - LOCATION: LOCATION: FLANK

## 2020-09-18 ASSESSMENT — PAIN SCALES - GENERAL: PAINLEVEL_OUTOF10: 3

## 2020-09-18 NOTE — ED PROVIDER NOTES
[address] ED  EMERGENCY DEPARTMENT ENCOUNTER        Pt Name: Manju Hardin  MRN: [id number]  [place] 1975  Date of evaluation: 9/17/2020  Provider: SABA Hernandez  PCP: RAJINDER Ramon - CNP    This patient was not seen and evaluated by the attending physician No att. providers found. I have evaluated this patient. My supervising physician was available for consultation. CHIEF COMPLAINT       Chief Complaint   Patient presents with    Flank Pain     pt c/o R sided flank pain since this evening, pt has hx of kidney stones and states this feels like one, pt c/o dark urine and burning with urination       HISTORY OF PRESENT ILLNESS   (Location/Symptom, Timing/Onset, Context/Setting, Quality, Duration, Modifying Factors, Severity)  Note limiting factors. Manju Hardin is a 45 y.o. male with significant past medical history of hypertension hyperlipidemia obesity, kidney stones who presents via private vehicle from his home with his wife for evaluation of flank pain. Patient notes that he has had intermittent pain for the last couple weeks to the right side however today the pain became very intense, sharp, and more frequent. He denies any dysuria hematuria frequency urgency but notes worsening pain with urination. He denies any fevers body aches or chills he endorses nausea and vomiting secondary to just to the severe pain. No cough runny nose nasal congestion or sore throat. No other abdominal pain. He has not taken any medicines recently for this. He notes past medical history of kidney stones requiring stenting several years ago. He does not currently follow with urology. Nursing Notes were all reviewed and agreed with or any disagreements were addressed  in the HPI. REVIEW OF SYSTEMS    (2-9 systems for level 4, 10 or more for level 5)     Review of Systems    Positives and Pertinent negatives as per HPI.   Except as noted abovein the ROS, all other systems were reviewed and negative. PAST MEDICAL HISTORY     Past Medical History:   Diagnosis Date    Hypertension     Kidney stones     MRSA (methicillin resistant Staphylococcus aureus) 12/10/12    SAAD (obstructive sleep apnea)          SURGICAL HISTORY     Past Surgical History:   Procedure Laterality Date    OTHER SURGICAL HISTORY  12/13/12    Wide Excision of Nonhealing Cysts Right Axilla and Bilateral Upper Inner Thighs    OTHER SURGICAL HISTORY  09/25/2013    cystoscopy, right retrograde, ureteroscopy, Holmium Laser lithotripsy, stone extraction         CURRENTMEDICATIONS       Discharge Medication List as of 9/18/2020  1:01 AM      CONTINUE these medications which have NOT CHANGED    Details   famotidine (PEPCID) 20 MG tablet Take 20 mg by mouth 2 times dailyHistorical Med      TRULICITY 4.04 ND/2.4IY SOPN DAWHistorical Med      metFORMIN (GLUCOPHAGE) 1000 MG tablet Historical Med      lisinopril (PRINIVIL;ZESTRIL) 20 MG tablet Take 1 tablet by mouth daily, Disp-90 tablet, R-0      metoprolol (LOPRESSOR) 25 MG tablet Take 1 tablet by mouth 2 times daily. , Disp-180 tablet, R-1      naproxen (NAPROSYN) 375 MG tablet Take 1 tablet by mouth 2 times daily for 10 days, Disp-20 tablet, R-0Print               ALLERGIES     Amoxicillin; Erythromycin; and Pcn [penicillins]    FAMILYHISTORY       Family History   Problem Relation Age of Onset    Diabetes Mother     Asthma Father     COPD Father           SOCIAL HISTORY       Social History     Socioeconomic History    Marital status:      Spouse name: Not on file    Number of children: Not on file    Years of education: Not on file    Highest education level: Not on file   Occupational History    Not on file   Social Needs    Financial resource strain: Not on file    Food insecurity     Worry: Not on file     Inability: Not on file    Transportation needs     Medical: Not on file     Non-medical: Not on file   Tobacco Use    Smoking status: Current Every Day Smoker     Packs/day: 1.00     Years: 10.00     Pack years: 10.00     Types: Cigarettes     Last attempt to quit: 2013     Years since quittin.6    Smokeless tobacco: Current User     Types: Chew    Tobacco comment: 19 1 pack every 4 days   Substance and Sexual Activity    Alcohol use: Yes     Comment: till 1 mo. ago was drinking every night     Drug use: No     Comment: 6-7 yrs ago    Sexual activity: Yes     Partners: Female   Lifestyle    Physical activity     Days per week: Not on file     Minutes per session: Not on file    Stress: Not on file   Relationships    Social connections     Talks on phone: Not on file     Gets together: Not on file     Attends Jainism service: Not on file     Active member of club or organization: Not on file     Attends meetings of clubs or organizations: Not on file     Relationship status: Not on file    Intimate partner violence     Fear of current or ex partner: Not on file     Emotionally abused: Not on file     Physically abused: Not on file     Forced sexual activity: Not on file   Other Topics Concern    Not on file   Social History Narrative    Not on file       SCREENINGS             PHYSICAL EXAM    (up to 7 for level 4, 8 or more for level 5)     ED Triage Vitals [20 2248]   BP Temp Temp Source Pulse Resp SpO2 Height Weight   (!) 151/82 97.8 °F (36.6 °C) Temporal 85 18 98 % 5' 10\" (1.778 m) 295 lb (133.8 kg)       Physical Exam  Vitals signs and nursing note reviewed. Constitutional:       General: He is awake. He is in acute distress. Appearance: Normal appearance. He is well-developed. He is obese. He is not ill-appearing, toxic-appearing or diaphoretic. HENT:      Head: Normocephalic and atraumatic. Right Ear: External ear normal.      Left Ear: External ear normal.      Nose: Nose normal. No congestion.       Mouth/Throat:      Mouth: Mucous membranes are moist.      Pharynx: Oropharynx is clear. Eyes:      General:         Right eye: No discharge. Left eye: No discharge. Extraocular Movements: Extraocular movements intact. Conjunctiva/sclera: Conjunctivae normal.      Pupils: Pupils are equal, round, and reactive to light. Neck:      Musculoskeletal: Normal range of motion and neck supple. No neck rigidity or muscular tenderness. Cardiovascular:      Rate and Rhythm: Normal rate and regular rhythm. Pulses:           Radial pulses are 2+ on the right side and 2+ on the left side. Dorsalis pedis pulses are 2+ on the right side and 2+ on the left side. Heart sounds: Normal heart sounds. No murmur. No friction rub. No gallop. Pulmonary:      Effort: Pulmonary effort is normal. No respiratory distress. Breath sounds: Normal breath sounds. No wheezing or rales. Abdominal:      General: Abdomen is protuberant. Bowel sounds are normal. There is no distension. Palpations: Abdomen is soft. Hernia: No hernia is present. Comments: Tenderness to the RIGHT flank    Musculoskeletal:      Right lower leg: No edema. Left lower leg: No edema. Lymphadenopathy:      Cervical: No cervical adenopathy. Skin:     General: Skin is warm and dry. Capillary Refill: Capillary refill takes less than 2 seconds. Neurological:      General: No focal deficit present. Mental Status: He is alert, oriented to person, place, and time and easily aroused. Sensory: No sensory deficit. Motor: No weakness. Gait: Gait normal.   Psychiatric:         Mood and Affect: Mood normal.         Behavior: Behavior normal. Behavior is cooperative.          DIAGNOSTIC RESULTS   LABS:    Labs Reviewed   CBC WITH AUTO DIFFERENTIAL - Abnormal; Notable for the following components:       Result Value    WBC 13.6 (*)     Neutrophils Absolute 10.4 (*)     All other components within normal limits    Narrative:     Performed at:  [facility] [address] [facility]   Phone [phone]   COMPREHENSIVE METABOLIC PANEL W/ REFLEX TO MG FOR LOW K - Abnormal; Notable for the following components:    Glucose 183 (*)     ALT 53 (*)     All other components within normal limits    Narrative:     Performed at:  [facility] [address],  [place], [facility]   Phone [phone]   URINALYSIS - Abnormal; Notable for the following components:    Blood, Urine LARGE (*)     Protein, UA TRACE (*)     All other components within normal limits    Narrative:     Performed at:  [facility] [address],  [place], [facility]   Phone [phone]   MICROSCOPIC URINALYSIS - Abnormal; Notable for the following components:    Mucus, UA 2+ (*)     RBC, UA >100 (*)     Bacteria, UA 1+ (*)     All other components within normal limits    Narrative:     Performed at:  [facility]) [facility] [address],  [place], [facility]   Phone [phone]   CULTURE, URINE       All other labs were within normal range or not returned as of this dictation. EKG: All EKG's are interpreted by the Emergency Department Physician who either signs orCo-signs this chart in the absence of a cardiologist.  Please see their note for interpretation of EKG. RADIOLOGY:   Non-plain film images such as CT, Ultrasound and MRI are read by the radiologist. David Faye radiographic images are visualized andpreliminarily interpreted by the  ED Provider with the below findings:        Interpretation perthe Radiologist below, if available at the time of this note:    CT ABDOMEN PELVIS WO CONTRAST Additional Contrast? None   Final Result   6 cm stone in the proximal right ureter with mild right hydronephrosis. Additional 4 mm smaller nonobstructing stones right kidney. 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 Fatty liver.            No results found.       PROCEDURES   Unless otherwise noted below, none     Procedures    CRITICAL CARE TIME   N/A    CONSULTS:  None      EMERGENCY DEPARTMENT COURSE and DIFFERENTIALDIAGNOSIS/MDM:   Vitals:    Vitals:    09/17/20 2248 09/18/20 0016   BP: (!) 151/82 137/75   Pulse: 85 82   Resp: 18 16   Temp: 97.8 °F (36.6 °C)    TempSrc: Temporal    SpO2: 98% 99%   Weight: 295 lb (133.8 kg)    Height: 5' 10\" (1.778 m)        Patient was given thefollowing medications:  Medications   ketorolac (TORADOL) injection 15 mg (15 mg Intravenous Given 9/17/20 2343)   0.9 % sodium chloride bolus (0 mLs Intravenous Stopped 9/18/20 0101)   ondansetron (ZOFRAN) injection 4 mg (4 mg Intravenous Given 9/17/20 2344)       PDMP Monitoring:    Last PDMP Earlis Angry as Reviewed [facility]):  Review User Review Instant Review Result            Urine Drug Screenings (1 yr)     Drug screen multi urine  Collected: 1/24/2013  6:05 PM (Final result)    Narrative: This method is a screening test to detect only these drug classes as  part of a medical workup. Confirmatory testing by another method should  be ordered if clinically indicated. Complete Results          Drug screen multi urine  Collected: 1/23/2013  2:35 PM (Final result)    Narrative: This method is a screening test to detect only these drug classes as  part of a medical workup. Confirmatory testing by another method should  be ordered if clinically indicated. Complete Results              Medication Contract and Consent for Opioid Use Documents Filed      No documents found                MDM:   Patient seen and evaluated. Old records reviewed. Diagnostic testing reviewed and results discussed. I have independently evaluated this patient based upon my scope of practice. Supervising physician was in the department for consultation as needed. Patient is a 73-year-old male who presents for evaluation of flank pain.   On exam he is alert oriented afebrile well-perfused hemodynamically stable nontoxic in appearance. He is complaining of acute right-sided flank pain. He has tenderness in this area, no peritoneal signs. Labs reviewed, he has large hematuria no evidence of acute cystitis. He has a mild leukocytosis no evidence of left shift. Patient is not meeting sirs septicemia criteria I have low concern for this. CT abdomen pelvis confirms a 6 mm stone in the proximal right ureter with mild right hydronephrosis however his renal function is maintained on complete metabolic panel. Incidentally he also has a 7 cm myelolipoma arising from the left adrenal gland and fatty liver. Patient was advised of these results. At this time I believe he is reasonable candidate for discharge home with close follow-up with urology and his PCP and strict return precautions. Based on patient's clinical history and clinical findings I currently estimate there is low risk for acute appendicitis, bowel obstruction, cholecystitis, diverticulitis, incarcerated hernia, pancreatitis,  perforated bowel, perforated ulcer, UTI, torsion, pyelonephritis. We have discussed the symptoms which are most concerning (e.g., bloody stool, fever, changing or worsening pain, hematemesis, bleeding) that necessitate immediate return. Pt is in agreement with the current plan and all questions were addressed. Discharge Time out:  CC Reviewed Yes   Test Results Yes     Vitals:    09/18/20 0016   BP: 137/75   Pulse: 82   Resp: 16   Temp:    SpO2: 99%            FINAL IMPRESSION      1. Kidney stone    2.  Mass of adrenal gland [facility])          DISPOSITION/PLAN   DISPOSITION Decision To Discharge 09/18/2020 12:50:36 AM      PATIENT REFERREDTO:  Faustino Hester MD  [address] [facility] [facility] [address] [facility] [address] [phone]    Call in 1 day      [address] [facility]  [phone]    Schedule an appointment as soon as possible for a visit       [facility] SPECIALTY [facility] [place] ED  [address] [facility]  [phone]  Go to   If symptoms worsen      DISCHARGE MEDICATIONS:  Discharge Medication List as of 9/18/2020  1:01 AM      START taking these medications    Details   HYDROcodone-acetaminophen (NORCO) 5-325 MG per tablet Take 1 tablet by mouth every 8 hours as needed for Pain for up to 3 days. Intended supply: 3 days.  Take lowest dose possible to manage pain, Disp-9 tablet,R-0Print      ondansetron (ZOFRAN ODT) 4 MG disintegrating tablet Take 1 tablet by mouth every 8 hours as needed for Nausea, Disp-20 tablet,R-0Print      tamsulosin (FLOMAX) 0.4 MG capsule Take 1 capsule by mouth daily for 5 doses, Disp-5 capsule,R-0Print      ibuprofen (IBU) 800 MG tablet Take 1 tablet by mouth every 8 hours as needed for Pain, Disp-20 tablet,R-0Print             DISCONTINUED MEDICATIONS:  Discharge Medication List as of 9/18/2020  1:01 AM                 (Please note that portions ofthis note were completed with a voice recognition program.  Efforts were made to edit the dictations but occasionally words are mis-transcribed.)    SABA Horvath (electronically signed)        SABA Horvath  09/18/20 0122

## 2020-09-21 ENCOUNTER — ANESTHESIA EVENT (OUTPATIENT)
Dept: OPERATING ROOM | Age: 45
DRG: 661 | End: 2020-09-21
Payer: COMMERCIAL

## 2020-09-21 ENCOUNTER — APPOINTMENT (OUTPATIENT)
Dept: ULTRASOUND IMAGING | Age: 45
DRG: 661 | End: 2020-09-21
Payer: COMMERCIAL

## 2020-09-21 ENCOUNTER — HOSPITAL ENCOUNTER (INPATIENT)
Age: 45
LOS: 1 days | Discharge: HOME | DRG: 661 | End: 2020-09-21
Attending: STUDENT IN AN ORGANIZED HEALTH CARE EDUCATION/TRAINING PROGRAM | Admitting: INTERNAL MEDICINE
Payer: COMMERCIAL

## 2020-09-21 ENCOUNTER — ANESTHESIA (OUTPATIENT)
Dept: OPERATING ROOM | Age: 45
DRG: 661 | End: 2020-09-21
Payer: COMMERCIAL

## 2020-09-21 ENCOUNTER — APPOINTMENT (OUTPATIENT)
Dept: GENERAL RADIOLOGY | Age: 45
DRG: 661 | End: 2020-09-21
Payer: COMMERCIAL

## 2020-09-21 VITALS
DIASTOLIC BLOOD PRESSURE: 93 MMHG | OXYGEN SATURATION: 94 % | HEART RATE: 73 BPM | WEIGHT: 288 LBS | SYSTOLIC BLOOD PRESSURE: 146 MMHG | TEMPERATURE: 97.4 F | BODY MASS INDEX: 40.32 KG/M2 | HEIGHT: 71 IN | RESPIRATION RATE: 18 BRPM

## 2020-09-21 VITALS
OXYGEN SATURATION: 84 % | DIASTOLIC BLOOD PRESSURE: 43 MMHG | SYSTOLIC BLOOD PRESSURE: 87 MMHG | RESPIRATION RATE: 1 BRPM

## 2020-09-21 DIAGNOSIS — N13.2 HYDRONEPHROSIS WITH URINARY OBSTRUCTION DUE TO URETERAL CALCULUS: ICD-10-CM

## 2020-09-21 DIAGNOSIS — N20.0 KIDNEY STONE: Primary | ICD-10-CM

## 2020-09-21 DIAGNOSIS — R52 INTRACTABLE PAIN: ICD-10-CM

## 2020-09-21 LAB
A/G RATIO: 1.8 (ref 1.1–2.2)
ALBUMIN SERPL-MCNC: 4.6 G/DL (ref 3.4–5)
ALP BLD-CCNC: 103 U/L (ref 40–129)
ALT SERPL-CCNC: 42 U/L (ref 10–40)
AMORPHOUS: ABNORMAL /HPF
ANION GAP SERPL CALCULATED.3IONS-SCNC: 16 MMOL/L (ref 3–16)
AST SERPL-CCNC: 20 U/L (ref 15–37)
BACTERIA: ABNORMAL /HPF
BASOPHILS ABSOLUTE: 0.1 K/UL (ref 0–0.2)
BASOPHILS RELATIVE PERCENT: 0.7 %
BILIRUB SERPL-MCNC: 0.3 MG/DL (ref 0–1)
BILIRUBIN URINE: NEGATIVE
BLOOD, URINE: ABNORMAL
BUN BLDV-MCNC: 13 MG/DL (ref 7–20)
CALCIUM SERPL-MCNC: 9.6 MG/DL (ref 8.3–10.6)
CHLORIDE BLD-SCNC: 97 MMOL/L (ref 99–110)
CLARITY: CLEAR
CO2: 20 MMOL/L (ref 21–32)
COLOR: YELLOW
CREAT SERPL-MCNC: 1 MG/DL (ref 0.9–1.3)
EOSINOPHILS ABSOLUTE: 0.2 K/UL (ref 0–0.6)
EOSINOPHILS RELATIVE PERCENT: 1.2 %
EPITHELIAL CELLS, UA: ABNORMAL /HPF (ref 0–5)
GFR AFRICAN AMERICAN: >60
GFR NON-AFRICAN AMERICAN: >60
GLOBULIN: 2.5 G/DL
GLUCOSE BLD-MCNC: 102 MG/DL (ref 70–99)
GLUCOSE BLD-MCNC: 112 MG/DL (ref 70–99)
GLUCOSE BLD-MCNC: 132 MG/DL (ref 70–99)
GLUCOSE BLD-MCNC: 163 MG/DL (ref 70–99)
GLUCOSE URINE: NEGATIVE MG/DL
HCT VFR BLD CALC: 43.4 % (ref 40.5–52.5)
HEMOGLOBIN: 14.6 G/DL (ref 13.5–17.5)
KETONES, URINE: ABNORMAL MG/DL
LEUKOCYTE ESTERASE, URINE: NEGATIVE
LIPASE: 29 U/L (ref 13–60)
LYMPHOCYTES ABSOLUTE: 2 K/UL (ref 1–5.1)
LYMPHOCYTES RELATIVE PERCENT: 14.3 %
MCH RBC QN AUTO: 29.3 PG (ref 26–34)
MCHC RBC AUTO-ENTMCNC: 33.6 G/DL (ref 31–36)
MCV RBC AUTO: 87.2 FL (ref 80–100)
MICROSCOPIC EXAMINATION: YES
MONOCYTES ABSOLUTE: 0.8 K/UL (ref 0–1.3)
MONOCYTES RELATIVE PERCENT: 5.6 %
MUCUS: ABNORMAL /LPF
NEUTROPHILS ABSOLUTE: 11.1 K/UL (ref 1.7–7.7)
NEUTROPHILS RELATIVE PERCENT: 78.2 %
NITRITE, URINE: NEGATIVE
PDW BLD-RTO: 13.6 % (ref 12.4–15.4)
PERFORMED ON: ABNORMAL
PH UA: 7 (ref 5–8)
PLATELET # BLD: 376 K/UL (ref 135–450)
PMV BLD AUTO: 7.8 FL (ref 5–10.5)
POTASSIUM REFLEX MAGNESIUM: 4.2 MMOL/L (ref 3.5–5.1)
PROTEIN UA: NEGATIVE MG/DL
RBC # BLD: 4.97 M/UL (ref 4.2–5.9)
RBC UA: ABNORMAL /HPF (ref 0–4)
SODIUM BLD-SCNC: 133 MMOL/L (ref 136–145)
SPECIFIC GRAVITY UA: 1.02 (ref 1–1.03)
TOTAL PROTEIN: 7.1 G/DL (ref 6.4–8.2)
URINE TYPE: ABNORMAL
UROBILINOGEN, URINE: 0.2 E.U./DL
WBC # BLD: 14.2 K/UL (ref 4–11)
WBC UA: ABNORMAL /HPF (ref 0–5)

## 2020-09-21 PROCEDURE — 6360000002 HC RX W HCPCS: Performed by: STUDENT IN AN ORGANIZED HEALTH CARE EDUCATION/TRAINING PROGRAM

## 2020-09-21 PROCEDURE — C1758 CATHETER, URETERAL: HCPCS | Performed by: UROLOGY

## 2020-09-21 PROCEDURE — 3700000001 HC ADD 15 MINUTES (ANESTHESIA): Performed by: UROLOGY

## 2020-09-21 PROCEDURE — 80053 COMPREHEN METABOLIC PANEL: CPT

## 2020-09-21 PROCEDURE — 85025 COMPLETE CBC W/AUTO DIFF WBC: CPT

## 2020-09-21 PROCEDURE — 2580000003 HC RX 258: Performed by: NURSE ANESTHETIST, CERTIFIED REGISTERED

## 2020-09-21 PROCEDURE — 6370000000 HC RX 637 (ALT 250 FOR IP): Performed by: UROLOGY

## 2020-09-21 PROCEDURE — 3600000004 HC SURGERY LEVEL 4 BASE: Performed by: UROLOGY

## 2020-09-21 PROCEDURE — 36415 COLL VENOUS BLD VENIPUNCTURE: CPT

## 2020-09-21 PROCEDURE — 83690 ASSAY OF LIPASE: CPT

## 2020-09-21 PROCEDURE — 0T768DZ DILATION OF RIGHT URETER WITH INTRALUMINAL DEVICE, VIA NATURAL OR ARTIFICIAL OPENING ENDOSCOPIC: ICD-10-PCS | Performed by: UROLOGY

## 2020-09-21 PROCEDURE — 99222 1ST HOSP IP/OBS MODERATE 55: CPT | Performed by: INTERNAL MEDICINE

## 2020-09-21 PROCEDURE — 76770 US EXAM ABDO BACK WALL COMP: CPT

## 2020-09-21 PROCEDURE — 7100000001 HC PACU RECOVERY - ADDTL 15 MIN: Performed by: UROLOGY

## 2020-09-21 PROCEDURE — 6360000002 HC RX W HCPCS: Performed by: NURSE ANESTHETIST, CERTIFIED REGISTERED

## 2020-09-21 PROCEDURE — 96375 TX/PRO/DX INJ NEW DRUG ADDON: CPT

## 2020-09-21 PROCEDURE — C2617 STENT, NON-COR, TEM W/O DEL: HCPCS | Performed by: UROLOGY

## 2020-09-21 PROCEDURE — 74420 UROGRAPHY RTRGR +-KUB: CPT

## 2020-09-21 PROCEDURE — 74018 RADEX ABDOMEN 1 VIEW: CPT

## 2020-09-21 PROCEDURE — 2580000003 HC RX 258: Performed by: STUDENT IN AN ORGANIZED HEALTH CARE EDUCATION/TRAINING PROGRAM

## 2020-09-21 PROCEDURE — C1769 GUIDE WIRE: HCPCS | Performed by: UROLOGY

## 2020-09-21 PROCEDURE — 1200000000 HC SEMI PRIVATE

## 2020-09-21 PROCEDURE — 7100000000 HC PACU RECOVERY - FIRST 15 MIN: Performed by: UROLOGY

## 2020-09-21 PROCEDURE — 81001 URINALYSIS AUTO W/SCOPE: CPT

## 2020-09-21 PROCEDURE — 3700000000 HC ANESTHESIA ATTENDED CARE: Performed by: UROLOGY

## 2020-09-21 PROCEDURE — 6370000000 HC RX 637 (ALT 250 FOR IP): Performed by: INTERNAL MEDICINE

## 2020-09-21 PROCEDURE — 2500000003 HC RX 250 WO HCPCS: Performed by: NURSE ANESTHETIST, CERTIFIED REGISTERED

## 2020-09-21 PROCEDURE — 83036 HEMOGLOBIN GLYCOSYLATED A1C: CPT

## 2020-09-21 PROCEDURE — 99285 EMERGENCY DEPT VISIT HI MDM: CPT

## 2020-09-21 PROCEDURE — 3600000014 HC SURGERY LEVEL 4 ADDTL 15MIN: Performed by: UROLOGY

## 2020-09-21 PROCEDURE — 2580000003 HC RX 258: Performed by: INTERNAL MEDICINE

## 2020-09-21 PROCEDURE — 6360000002 HC RX W HCPCS: Performed by: INTERNAL MEDICINE

## 2020-09-21 PROCEDURE — 96376 TX/PRO/DX INJ SAME DRUG ADON: CPT

## 2020-09-21 PROCEDURE — 2580000003 HC RX 258: Performed by: UROLOGY

## 2020-09-21 PROCEDURE — 96374 THER/PROPH/DIAG INJ IV PUSH: CPT

## 2020-09-21 PROCEDURE — 2709999900 HC NON-CHARGEABLE SUPPLY: Performed by: UROLOGY

## 2020-09-21 DEVICE — STENT URET 6FR L26CM PERCFLX HYDR+ TAPR TIP GRAD: Type: IMPLANTABLE DEVICE | Site: URETER | Status: FUNCTIONAL

## 2020-09-21 RX ORDER — SODIUM CHLORIDE 0.9 % (FLUSH) 0.9 %
10 SYRINGE (ML) INJECTION PRN
Status: DISCONTINUED | OUTPATIENT
Start: 2020-09-21 | End: 2020-09-21 | Stop reason: HOSPADM

## 2020-09-21 RX ORDER — DEXAMETHASONE SODIUM PHOSPHATE 4 MG/ML
INJECTION, SOLUTION INTRA-ARTICULAR; INTRALESIONAL; INTRAMUSCULAR; INTRAVENOUS; SOFT TISSUE PRN
Status: DISCONTINUED | OUTPATIENT
Start: 2020-09-21 | End: 2020-09-21 | Stop reason: SDUPTHER

## 2020-09-21 RX ORDER — ACETAMINOPHEN 325 MG/1
650 TABLET ORAL EVERY 6 HOURS PRN
Status: DISCONTINUED | OUTPATIENT
Start: 2020-09-21 | End: 2020-09-21 | Stop reason: HOSPADM

## 2020-09-21 RX ORDER — ROCURONIUM BROMIDE 10 MG/ML
INJECTION, SOLUTION INTRAVENOUS PRN
Status: DISCONTINUED | OUTPATIENT
Start: 2020-09-21 | End: 2020-09-21 | Stop reason: SDUPTHER

## 2020-09-21 RX ORDER — DEXTROSE MONOHYDRATE 25 G/50ML
12.5 INJECTION, SOLUTION INTRAVENOUS PRN
Status: DISCONTINUED | OUTPATIENT
Start: 2020-09-21 | End: 2020-09-21 | Stop reason: HOSPADM

## 2020-09-21 RX ORDER — DIPHENHYDRAMINE HYDROCHLORIDE 50 MG/ML
12.5 INJECTION INTRAMUSCULAR; INTRAVENOUS
Status: DISCONTINUED | OUTPATIENT
Start: 2020-09-21 | End: 2020-09-21 | Stop reason: HOSPADM

## 2020-09-21 RX ORDER — SULFAMETHOXAZOLE AND TRIMETHOPRIM 400; 80 MG/1; MG/1
1 TABLET ORAL 2 TIMES DAILY
Qty: 8 TABLET | Refills: 0 | Status: SHIPPED | OUTPATIENT
Start: 2020-09-21 | End: 2020-09-25

## 2020-09-21 RX ORDER — PROPOFOL 10 MG/ML
INJECTION, EMULSION INTRAVENOUS PRN
Status: DISCONTINUED | OUTPATIENT
Start: 2020-09-21 | End: 2020-09-21 | Stop reason: SDUPTHER

## 2020-09-21 RX ORDER — POTASSIUM CHLORIDE 7.45 MG/ML
10 INJECTION INTRAVENOUS PRN
Status: DISCONTINUED | OUTPATIENT
Start: 2020-09-21 | End: 2020-09-21 | Stop reason: HOSPADM

## 2020-09-21 RX ORDER — LISINOPRIL 10 MG/1
10 TABLET ORAL DAILY
Status: DISCONTINUED | OUTPATIENT
Start: 2020-09-21 | End: 2020-09-21

## 2020-09-21 RX ORDER — ONDANSETRON 2 MG/ML
4 INJECTION INTRAMUSCULAR; INTRAVENOUS PRN
Status: DISCONTINUED | OUTPATIENT
Start: 2020-09-21 | End: 2020-09-21 | Stop reason: HOSPADM

## 2020-09-21 RX ORDER — POLYETHYLENE GLYCOL 3350 17 G/17G
17 POWDER, FOR SOLUTION ORAL DAILY PRN
Status: DISCONTINUED | OUTPATIENT
Start: 2020-09-21 | End: 2020-09-21 | Stop reason: HOSPADM

## 2020-09-21 RX ORDER — ONDANSETRON 2 MG/ML
4 INJECTION INTRAMUSCULAR; INTRAVENOUS EVERY 6 HOURS PRN
Status: DISCONTINUED | OUTPATIENT
Start: 2020-09-21 | End: 2020-09-21 | Stop reason: HOSPADM

## 2020-09-21 RX ORDER — SODIUM CHLORIDE 9 MG/ML
INJECTION, SOLUTION INTRAVENOUS CONTINUOUS
Status: DISCONTINUED | OUTPATIENT
Start: 2020-09-21 | End: 2020-09-21 | Stop reason: HOSPADM

## 2020-09-21 RX ORDER — PHENAZOPYRIDINE HYDROCHLORIDE 200 MG/1
200 TABLET, FILM COATED ORAL 3 TIMES DAILY PRN
Qty: 15 TABLET | Refills: 0 | Status: SHIPPED | OUTPATIENT
Start: 2020-09-21 | End: 2020-09-26

## 2020-09-21 RX ORDER — SODIUM CHLORIDE, SODIUM LACTATE, POTASSIUM CHLORIDE, CALCIUM CHLORIDE 600; 310; 30; 20 MG/100ML; MG/100ML; MG/100ML; MG/100ML
INJECTION, SOLUTION INTRAVENOUS CONTINUOUS PRN
Status: DISCONTINUED | OUTPATIENT
Start: 2020-09-21 | End: 2020-09-21 | Stop reason: SDUPTHER

## 2020-09-21 RX ORDER — DEXTROSE MONOHYDRATE 50 MG/ML
100 INJECTION, SOLUTION INTRAVENOUS PRN
Status: DISCONTINUED | OUTPATIENT
Start: 2020-09-21 | End: 2020-09-21 | Stop reason: HOSPADM

## 2020-09-21 RX ORDER — PROMETHAZINE HYDROCHLORIDE 25 MG/1
12.5 TABLET ORAL EVERY 6 HOURS PRN
Status: DISCONTINUED | OUTPATIENT
Start: 2020-09-21 | End: 2020-09-21 | Stop reason: HOSPADM

## 2020-09-21 RX ORDER — ONDANSETRON 2 MG/ML
INJECTION INTRAMUSCULAR; INTRAVENOUS PRN
Status: DISCONTINUED | OUTPATIENT
Start: 2020-09-21 | End: 2020-09-21 | Stop reason: SDUPTHER

## 2020-09-21 RX ORDER — MORPHINE SULFATE 10 MG/ML
2 INJECTION, SOLUTION INTRAMUSCULAR; INTRAVENOUS EVERY 5 MIN PRN
Status: DISCONTINUED | OUTPATIENT
Start: 2020-09-21 | End: 2020-09-21 | Stop reason: HOSPADM

## 2020-09-21 RX ORDER — FENTANYL CITRATE 50 UG/ML
INJECTION, SOLUTION INTRAMUSCULAR; INTRAVENOUS PRN
Status: DISCONTINUED | OUTPATIENT
Start: 2020-09-21 | End: 2020-09-21 | Stop reason: SDUPTHER

## 2020-09-21 RX ORDER — MAGNESIUM SULFATE IN WATER 40 MG/ML
2 INJECTION, SOLUTION INTRAVENOUS PRN
Status: DISCONTINUED | OUTPATIENT
Start: 2020-09-21 | End: 2020-09-21 | Stop reason: HOSPADM

## 2020-09-21 RX ORDER — HYDRALAZINE HYDROCHLORIDE 20 MG/ML
5 INJECTION INTRAMUSCULAR; INTRAVENOUS EVERY 10 MIN PRN
Status: DISCONTINUED | OUTPATIENT
Start: 2020-09-21 | End: 2020-09-21 | Stop reason: HOSPADM

## 2020-09-21 RX ORDER — LIDOCAINE HYDROCHLORIDE 20 MG/ML
INJECTION, SOLUTION INFILTRATION; PERINEURAL PRN
Status: DISCONTINUED | OUTPATIENT
Start: 2020-09-21 | End: 2020-09-21 | Stop reason: SDUPTHER

## 2020-09-21 RX ORDER — FAMOTIDINE 20 MG/1
20 TABLET, FILM COATED ORAL 2 TIMES DAILY
Status: DISCONTINUED | OUTPATIENT
Start: 2020-09-21 | End: 2020-09-21 | Stop reason: HOSPADM

## 2020-09-21 RX ORDER — MORPHINE SULFATE 2 MG/ML
2 INJECTION, SOLUTION INTRAMUSCULAR; INTRAVENOUS EVERY 4 HOURS PRN
Status: DISCONTINUED | OUTPATIENT
Start: 2020-09-21 | End: 2020-09-21

## 2020-09-21 RX ORDER — OXYCODONE HYDROCHLORIDE AND ACETAMINOPHEN 5; 325 MG/1; MG/1
2 TABLET ORAL PRN
Status: DISCONTINUED | OUTPATIENT
Start: 2020-09-21 | End: 2020-09-21 | Stop reason: HOSPADM

## 2020-09-21 RX ORDER — MORPHINE SULFATE 2 MG/ML
2 INJECTION, SOLUTION INTRAMUSCULAR; INTRAVENOUS
Status: DISCONTINUED | OUTPATIENT
Start: 2020-09-21 | End: 2020-09-21

## 2020-09-21 RX ORDER — KETOROLAC TROMETHAMINE 30 MG/ML
15 INJECTION, SOLUTION INTRAMUSCULAR; INTRAVENOUS ONCE
Status: COMPLETED | OUTPATIENT
Start: 2020-09-21 | End: 2020-09-21

## 2020-09-21 RX ORDER — SODIUM CHLORIDE 9 MG/ML
INJECTION, SOLUTION INTRAVENOUS CONTINUOUS
Status: DISCONTINUED | OUTPATIENT
Start: 2020-09-21 | End: 2020-09-21

## 2020-09-21 RX ORDER — SODIUM CHLORIDE 0.9 % (FLUSH) 0.9 %
10 SYRINGE (ML) INJECTION EVERY 12 HOURS SCHEDULED
Status: DISCONTINUED | OUTPATIENT
Start: 2020-09-21 | End: 2020-09-21 | Stop reason: HOSPADM

## 2020-09-21 RX ORDER — OXYCODONE HYDROCHLORIDE AND ACETAMINOPHEN 5; 325 MG/1; MG/1
0.5 TABLET ORAL EVERY 4 HOURS PRN
Qty: 10 TABLET | Refills: 0 | Status: SHIPPED | OUTPATIENT
Start: 2020-09-21 | End: 2020-09-26

## 2020-09-21 RX ORDER — MAGNESIUM HYDROXIDE 1200 MG/15ML
LIQUID ORAL PRN
Status: DISCONTINUED | OUTPATIENT
Start: 2020-09-21 | End: 2020-09-21 | Stop reason: ALTCHOICE

## 2020-09-21 RX ORDER — LABETALOL HYDROCHLORIDE 5 MG/ML
5 INJECTION, SOLUTION INTRAVENOUS EVERY 10 MIN PRN
Status: DISCONTINUED | OUTPATIENT
Start: 2020-09-21 | End: 2020-09-21 | Stop reason: HOSPADM

## 2020-09-21 RX ORDER — OXYCODONE HYDROCHLORIDE AND ACETAMINOPHEN 5; 325 MG/1; MG/1
1 TABLET ORAL PRN
Status: DISCONTINUED | OUTPATIENT
Start: 2020-09-21 | End: 2020-09-21 | Stop reason: HOSPADM

## 2020-09-21 RX ORDER — ONDANSETRON 2 MG/ML
4 INJECTION INTRAMUSCULAR; INTRAVENOUS EVERY 6 HOURS PRN
Status: DISCONTINUED | OUTPATIENT
Start: 2020-09-21 | End: 2020-09-21

## 2020-09-21 RX ORDER — SUCCINYLCHOLINE CHLORIDE 20 MG/ML
INJECTION INTRAMUSCULAR; INTRAVENOUS PRN
Status: DISCONTINUED | OUTPATIENT
Start: 2020-09-21 | End: 2020-09-21 | Stop reason: SDUPTHER

## 2020-09-21 RX ORDER — KETOROLAC TROMETHAMINE 30 MG/ML
INJECTION, SOLUTION INTRAMUSCULAR; INTRAVENOUS PRN
Status: DISCONTINUED | OUTPATIENT
Start: 2020-09-21 | End: 2020-09-21 | Stop reason: SDUPTHER

## 2020-09-21 RX ORDER — ACETAMINOPHEN 650 MG/1
650 SUPPOSITORY RECTAL EVERY 6 HOURS PRN
Status: DISCONTINUED | OUTPATIENT
Start: 2020-09-21 | End: 2020-09-21 | Stop reason: HOSPADM

## 2020-09-21 RX ORDER — MEPERIDINE HYDROCHLORIDE 25 MG/ML
12.5 INJECTION INTRAMUSCULAR; INTRAVENOUS; SUBCUTANEOUS EVERY 5 MIN PRN
Status: DISCONTINUED | OUTPATIENT
Start: 2020-09-21 | End: 2020-09-21 | Stop reason: HOSPADM

## 2020-09-21 RX ORDER — MORPHINE SULFATE 10 MG/ML
1 INJECTION, SOLUTION INTRAMUSCULAR; INTRAVENOUS EVERY 5 MIN PRN
Status: DISCONTINUED | OUTPATIENT
Start: 2020-09-21 | End: 2020-09-21 | Stop reason: HOSPADM

## 2020-09-21 RX ORDER — LIDOCAINE HYDROCHLORIDE 20 MG/ML
JELLY TOPICAL PRN
Status: DISCONTINUED | OUTPATIENT
Start: 2020-09-21 | End: 2020-09-21 | Stop reason: ALTCHOICE

## 2020-09-21 RX ORDER — KETOROLAC TROMETHAMINE 30 MG/ML
15 INJECTION, SOLUTION INTRAMUSCULAR; INTRAVENOUS EVERY 6 HOURS PRN
Status: DISCONTINUED | OUTPATIENT
Start: 2020-09-21 | End: 2020-09-21 | Stop reason: HOSPADM

## 2020-09-21 RX ORDER — PROMETHAZINE HYDROCHLORIDE 25 MG/ML
6.25 INJECTION, SOLUTION INTRAMUSCULAR; INTRAVENOUS
Status: DISCONTINUED | OUTPATIENT
Start: 2020-09-21 | End: 2020-09-21 | Stop reason: HOSPADM

## 2020-09-21 RX ORDER — NICOTINE POLACRILEX 4 MG
15 LOZENGE BUCCAL PRN
Status: DISCONTINUED | OUTPATIENT
Start: 2020-09-21 | End: 2020-09-21 | Stop reason: HOSPADM

## 2020-09-21 RX ORDER — TAMSULOSIN HYDROCHLORIDE 0.4 MG/1
0.4 CAPSULE ORAL DAILY
Status: DISCONTINUED | OUTPATIENT
Start: 2020-09-21 | End: 2020-09-21 | Stop reason: HOSPADM

## 2020-09-21 RX ORDER — 0.9 % SODIUM CHLORIDE 0.9 %
1000 INTRAVENOUS SOLUTION INTRAVENOUS ONCE
Status: COMPLETED | OUTPATIENT
Start: 2020-09-21 | End: 2020-09-21

## 2020-09-21 RX ORDER — POTASSIUM CHLORIDE 20 MEQ/1
40 TABLET, EXTENDED RELEASE ORAL PRN
Status: DISCONTINUED | OUTPATIENT
Start: 2020-09-21 | End: 2020-09-21 | Stop reason: HOSPADM

## 2020-09-21 RX ADMIN — KETOROLAC TROMETHAMINE 15 MG: 30 INJECTION, SOLUTION INTRAMUSCULAR at 01:17

## 2020-09-21 RX ADMIN — MORPHINE SULFATE 2 MG: 2 INJECTION, SOLUTION INTRAMUSCULAR; INTRAVENOUS at 05:59

## 2020-09-21 RX ADMIN — ONDANSETRON HYDROCHLORIDE 4 MG: 2 INJECTION, SOLUTION INTRAMUSCULAR; INTRAVENOUS at 07:59

## 2020-09-21 RX ADMIN — SODIUM CHLORIDE, POTASSIUM CHLORIDE, SODIUM LACTATE AND CALCIUM CHLORIDE: 600; 310; 30; 20 INJECTION, SOLUTION INTRAVENOUS at 13:11

## 2020-09-21 RX ADMIN — FAMOTIDINE 20 MG: 20 TABLET ORAL at 08:59

## 2020-09-21 RX ADMIN — ONDANSETRON HYDROCHLORIDE 4 MG: 2 INJECTION, SOLUTION INTRAMUSCULAR; INTRAVENOUS at 01:17

## 2020-09-21 RX ADMIN — SUGAMMADEX 100 MG: 100 INJECTION, SOLUTION INTRAVENOUS at 13:27

## 2020-09-21 RX ADMIN — ONDANSETRON 4 MG: 2 INJECTION, SOLUTION INTRAMUSCULAR; INTRAVENOUS at 13:21

## 2020-09-21 RX ADMIN — LIDOCAINE HYDROCHLORIDE 40 MG: 20 INJECTION, SOLUTION INFILTRATION; PERINEURAL at 13:12

## 2020-09-21 RX ADMIN — SUCCINYLCHOLINE CHLORIDE 160 MG: 20 INJECTION, SOLUTION INTRAMUSCULAR; INTRAVENOUS at 13:12

## 2020-09-21 RX ADMIN — KETOROLAC TROMETHAMINE 15 MG: 30 INJECTION, SOLUTION INTRAMUSCULAR at 08:57

## 2020-09-21 RX ADMIN — ENOXAPARIN SODIUM 40 MG: 40 INJECTION SUBCUTANEOUS at 09:01

## 2020-09-21 RX ADMIN — METOPROLOL TARTRATE 12.5 MG: 25 TABLET, FILM COATED ORAL at 08:59

## 2020-09-21 RX ADMIN — SODIUM CHLORIDE: 9 INJECTION, SOLUTION INTRAVENOUS at 09:04

## 2020-09-21 RX ADMIN — SODIUM CHLORIDE 1000 ML: 9 INJECTION, SOLUTION INTRAVENOUS at 01:17

## 2020-09-21 RX ADMIN — SUGAMMADEX 100 MG: 100 INJECTION, SOLUTION INTRAVENOUS at 13:37

## 2020-09-21 RX ADMIN — FENTANYL CITRATE 50 MCG: 50 INJECTION INTRAMUSCULAR; INTRAVENOUS at 13:18

## 2020-09-21 RX ADMIN — SODIUM CHLORIDE: 9 INJECTION, SOLUTION INTRAVENOUS at 03:52

## 2020-09-21 RX ADMIN — DEXAMETHASONE SODIUM PHOSPHATE 8 MG: 4 INJECTION, SOLUTION INTRAMUSCULAR; INTRAVENOUS at 13:21

## 2020-09-21 RX ADMIN — MORPHINE SULFATE 2 MG: 2 INJECTION, SOLUTION INTRAMUSCULAR; INTRAVENOUS at 01:17

## 2020-09-21 RX ADMIN — PROPOFOL 200 MG: 10 INJECTION, EMULSION INTRAVENOUS at 13:12

## 2020-09-21 RX ADMIN — CEFTRIAXONE SODIUM 1 G: 1 INJECTION, POWDER, FOR SOLUTION INTRAMUSCULAR; INTRAVENOUS at 11:27

## 2020-09-21 RX ADMIN — MORPHINE SULFATE 2 MG: 2 INJECTION, SOLUTION INTRAMUSCULAR; INTRAVENOUS at 03:48

## 2020-09-21 RX ADMIN — ROCURONIUM BROMIDE 5 MG: 10 INJECTION, SOLUTION INTRAVENOUS at 13:12

## 2020-09-21 RX ADMIN — FENTANYL CITRATE 50 MCG: 50 INJECTION INTRAMUSCULAR; INTRAVENOUS at 13:12

## 2020-09-21 RX ADMIN — KETOROLAC TROMETHAMINE 30 MG: 30 INJECTION, SOLUTION INTRAMUSCULAR at 13:27

## 2020-09-21 RX ADMIN — MORPHINE SULFATE 2 MG: 2 INJECTION, SOLUTION INTRAMUSCULAR; INTRAVENOUS at 07:59

## 2020-09-21 ASSESSMENT — PULMONARY FUNCTION TESTS
PIF_VALUE: 19
PIF_VALUE: 17
PIF_VALUE: 4
PIF_VALUE: 36
PIF_VALUE: 1
PIF_VALUE: 28
PIF_VALUE: 27
PIF_VALUE: 27
PIF_VALUE: 24
PIF_VALUE: 23
PIF_VALUE: 27
PIF_VALUE: 29
PIF_VALUE: 1
PIF_VALUE: 26
PIF_VALUE: 27
PIF_VALUE: 4
PIF_VALUE: 39
PIF_VALUE: 12
PIF_VALUE: 1
PIF_VALUE: 22
PIF_VALUE: 27
PIF_VALUE: 1
PIF_VALUE: 26

## 2020-09-21 ASSESSMENT — PAIN SCALES - GENERAL
PAINLEVEL_OUTOF10: 7
PAINLEVEL_OUTOF10: 10
PAINLEVEL_OUTOF10: 0
PAINLEVEL_OUTOF10: 10
PAINLEVEL_OUTOF10: 7

## 2020-09-21 ASSESSMENT — PAIN DESCRIPTION - LOCATION: LOCATION: FLANK

## 2020-09-21 ASSESSMENT — PAIN DESCRIPTION - FREQUENCY: FREQUENCY: INTERMITTENT

## 2020-09-21 ASSESSMENT — PAIN SCALES - WONG BAKER: WONGBAKER_NUMERICALRESPONSE: 0

## 2020-09-21 NOTE — PROGRESS NOTES
Discharge instructions completed with pt and wife, no questions or needs noted. Pt currently has two different prescriptions for pain medication; pt was educated and advised not to take both at the same time. Pt acknowledged that he and his wife understand. Printed prescriptions given to pt's wife. IV bhas been removed. No further needs noted.

## 2020-09-21 NOTE — PROGRESS NOTES
Patient admitted to room ___215_ from ER. Patient oriented to room, call light, bed rails, phone, lights and bathroom. Patient instructed about the schedule of the day including: vital sign frequency, lab draws, possible tests, frequency of MD and staff rounds, daily weights, I &O's and prescribed diet. Bed alarm deferred patient low fall risk and refuses alarm. Bed locked, in lowest position, side rails up 2/4, call light within reach. Recliner Assessment:     Patient is able to demonstrated the ability to move from a reclining position to an upright position within the recliner. 4 Eyes Skin Assessment:     The patient is being assess for   Admission    I agree that 2 RN's have performed a thorough Head to Toe Skin Assessment on the patient. ALL assessment sites listed below have been assessed. Areas assessed by both nurses:   [x]   Head, Face, and Ears   [x]   Shoulders, Back, and Chest, Abdomen  [x]   Arms, Elbows, and Hands   [x]   Coccyx, Sacrum, and Ischium  [x]   Legs, Feet, and Heels        Scattered scabs and bruising. **SHARE this note so that the co-signing nurse is able to place an eSignature**    Co-signer eSignature: Electronically signed by Yasmine Cummins RN on 9/21/20 at 3:36 PM EDT    Does the Patient have Skin Breakdown?   No          Fabiano Prevention initiated:  No   Wound Care Orders initiated:  No      [facility] nurse consulted for Pressure Injury (Stage 3,4, Unstageable, DTI, NWPT, Complex wounds)and New or Established Ostomies:  No      Primary Nurse eSignature: Electronically signed by Raleigh Lopez RN on 3/45/99 at 9:24 AM EDT

## 2020-09-21 NOTE — ANESTHESIA PRE PROCEDURE
Department of Anesthesiology  Preprocedure Note       Name:  Cintia Aguirre   Age:  45 y.o.  :  1975                                          MRN:  [id number]         Date:  2020      Surgeon: Ricki Fox):  Khris Fletcher MD    Procedure: Procedure(s):  CYSTOSCOPY, RIGHT URETEROSCOPY WITH POSSIBLE HOLMIUM LASER LITHOTRIPSY, POSSIBLE RIGHT STENT PLACEMENT    Medications prior to admission:   Prior to Admission medications    Medication Sig Start Date End Date Taking? Authorizing Provider   HYDROcodone-acetaminophen (NORCO) 5-325 MG per tablet Take 1 tablet by mouth every 8 hours as needed for Pain for up to 3 days. Intended supply: 3 days. Take lowest dose possible to manage pain 20  SABA Amaya   ondansetron (ZOFRAN ODT) 4 MG disintegrating tablet Take 1 tablet by mouth every 8 hours as needed for Nausea 20   SABA Amaya   tamsulosin [facility]) 0.4 MG capsule Take 1 capsule by mouth daily for 5 doses 20  SABA Amaya   ibuprofen (IBU) 800 MG tablet Take 1 tablet by mouth every 8 hours as needed for Pain 20   SABA Amaya   famotidine (PEPCID) 20 MG tablet Take 20 mg by mouth 2 times daily    Historical Provider, MD   TRULICITY 5.02 [place]/7.1AH SOPN  20   Historical Provider, MD   metFORMIN (GLUCOPHAGE) 1000 MG tablet  20   Historical Provider, MD   naproxen (NAPROSYN) 375 MG tablet Take 1 tablet by mouth 2 times daily for 10 days 17  Ai Stanton, APRN - CNP   lisinopril (PRINIVIL;ZESTRIL) 20 MG tablet Take 1 tablet by mouth daily  Patient taking differently: Take 10 mg by mouth daily  11/18/15   Cintia Deluca MD   metoprolol (LOPRESSOR) 25 MG tablet Take 1 tablet by mouth 2 times daily.   Patient taking differently: Take 12.5 mg by mouth 2 times daily  14   Rosendo Miner MD       Current medications:    Current Facility-Administered Medications   Medication Dose Route Frequency Provider Last Rate Last Dose    famotidine (PEPCID) tablet 20 mg  20 mg Oral BID Karyle Milroy, MD   20 mg at 09/21/20 0859    metoprolol tartrate (LOPRESSOR) tablet 12.5 mg  12.5 mg Oral BID Karyle Milroy, MD   12.5 mg at 09/21/20 0859    tamsulosin (FLOMAX) capsule 0.4 mg  0.4 mg Oral Daily Karyle Milroy, MD        0.9 % sodium chloride infusion   Intravenous Continuous Karyle Milroy,  mL/hr at 09/21/20 8743      sodium chloride flush 0.9 % injection 10 mL  10 mL Intravenous 2 times per day Karyle Milroy, MD        sodium chloride flush 0.9 % injection 10 mL  10 mL Intravenous PRN Karyle Milroy, MD        acetaminophen (TYLENOL) tablet 650 mg  650 mg Oral Q6H PRN Karyle Milroy, MD        Or   [place] acetaminophen (TYLENOL) suppository 650 mg  650 mg Rectal Q6H PRN Karyle Milroy, MD        polyethylene glycol [facility]) packet 17 g  17 g Oral Daily PRN Karyle Milroy, MD        promethazine (PHENERGAN) tablet 12.5 mg  12.5 mg Oral Q6H PRN Karyle Milroy, MD        Or    ondansetron [facility]) injection 4 mg  4 mg Intravenous Q6H PRN Karyle Milroy, MD        enoxaparin (LOVENOX) injection 40 mg  40 mg Subcutaneous Daily Karyle Milroy, MD   40 mg at 09/21/20 0901    glucose (GLUTOSE) 40 % oral gel 15 g  15 g Oral PRN Karyle Milroy, MD        dextrose 50 % IV solution  12.5 g Intravenous PRN Karyle Milroy, MD        glucagon (rDNA) injection 1 mg  1 mg Intramuscular PRN Karyle Milroy, MD        dextrose 5 % solution  100 mL/hr Intravenous PRN Karyle Milroy, MD        insulin lispro (HUMALOG) injection vial 0-6 Units  0-6 Units Subcutaneous Q4H Karyle Milroy, MD        potassium chloride (KLOR-CON M) extended release tablet 40 mEq  40 mEq Oral PRN Karyle Milroy, MD        Or    potassium bicarb-citric acid (EFFER-K) effervescent tablet 40 mEq  40 mEq Oral PRN Karyle Milroy, MD        Or    potassium chloride 10 mEq/100 mL IVPB (Peripheral Line)  10 mEq Intravenous PRN Minh Lemus MD        magnesium sulfate 2 g in 50 mL IVPB premix  2 g Intravenous PRN Minh Lemus MD        nicotine (NICODERM CQ) 7 MG/24HR 1 patch  1 patch Transdermal Daily Minh Lemus MD        ketorolac (TORADOL) injection 15 mg  15 mg Intravenous Q6H PRN Jason Farooq MD   15 mg at 09/21/20 0857    HYDROmorphone (DILAUDID) injection 1 mg  1 mg Intravenous Q3H PRN Jason Farooq MD        Or    HYDROmorphone (DILAUDID) injection 0.5 mg  0.5 mg Intravenous Q3H PRN Jason Farooq MD        cefTRIAXone (ROCEPHIN) 1 g IVPB in 50 mL D5W minibag  1 g Intravenous Q24H Jason Farooq MD           Allergies:     Allergies   Allergen Reactions    Amoxicillin Nausea Only    Erythromycin     Pcn [Penicillins] Hives       Problem List:    Patient Active Problem List   Diagnosis Code    Attention deficit hyperactivity disorder (ADHD), predominantly inattentive type F90.0    HTN (hypertension) I10    Hyperglycemia R73.9    Family history of diabetes mellitus Z83.3    Severe obstructive sleep apnea G47.33    Thrombocytosis (HCC) D47.3    Obesity, Class III, BMI 40-49.9 (morbid obesity) (HCC) E66.01    Hydronephrosis with renal calculous obstruction N13.2    Kidney stone N20.0    Intractable pain R52       Past Medical History:        Diagnosis Date    Hypertension     Kidney stones     MRSA (methicillin resistant Staphylococcus aureus) 12/10/12    SAAD (obstructive sleep apnea)        Past Surgical History:        Procedure Laterality Date    OTHER SURGICAL HISTORY  12/13/12    Wide Excision of Nonhealing Cysts Right Axilla and Bilateral Upper Inner Thighs    OTHER SURGICAL HISTORY  09/25/2013    cystoscopy, right retrograde, ureteroscopy, Holmium Laser lithotripsy, stone extraction       Social History:    Social History     Tobacco Use    Smoking status: Current Every Day Smoker     Packs/day: 1.00     Years: 10.00     Pack years: 10.00     Types: Cigarettes     Last attempt to quit: 2013     Years since quittin.6    Smokeless tobacco: Current User     Types: Chew    Tobacco comment: 19 1 pack every 4 days   Substance Use Topics    Alcohol use: Yes     Comment: till 1 mo. ago was drinking every night                                 Ready to quit: Not Answered  Counseling given: Not Answered  Comment: 19 1 pack every 4 days      Vital Signs (Current):   Vitals:    20 0612 20 0705 20 0813 20 0930   BP: 139/79 (!) 173/136 (!) 152/88    Pulse: 80 83 81    Resp:     Temp:   97.2 °F (36.2 °C)    TempSrc:   Oral    SpO2: 98% 95% 97%    Weight:    288 lb (130.6 kg)   Height:                                                  BP Readings from Last 3 Encounters:   20 (!) 152/88   20 137/75   19 135/76       NPO Status:                                                                                 BMI:   Wt Readings from Last 3 Encounters:   20 288 lb (130.6 kg)   20 295 lb (133.8 kg)   19 (!) 314 lb 6.4 oz (142.6 kg)     Body mass index is 40.17 kg/m².     CBC:   Lab Results   Component Value Date    WBC 14.2 2020    RBC 4.97 2020    RBC 4.90 2016    HGB 14.6 2020    HCT 43.4 2020    MCV 87.2 2020    RDW 13.6 2020     2020       CMP:   Lab Results   Component Value Date     2020    K 4.2 2020    CL 97 2020    CO2 20 2020    BUN 13 2020    CREATININE 1.0 2020    GFRAA >60 2020    GFRAA >60 2013    AGRATIO 1.8 2020    LABGLOM >60 2020    GLUCOSE 163 2020    PROT 7.1 2020    PROT 7.6 2013    CALCIUM 9.6 2020    BILITOT 0.3 2020    ALKPHOS 103 2020    AST 20 2020    ALT 42 2020       POC Tests:   Recent Labs     20  0819   POCGLU 112*       Coags: No results found for: PROTIME, INR, APTT    HCG (If Applicable): No results found for: PREGTESTUR, PREGSERUM, HCG, HCGQUANT     ABGs: No results found for: PHART, PO2ART, ZJR9USB, FBH7MYG, BEART, T9LRPMYO     Type & Screen (If Applicable):  No results found for: LABABO, LABRH    Drug/Infectious Status (If Applicable):  No results found for: HIV, HEPCAB    COVID-19 Screening (If Applicable): No results found for: COVID19      Anesthesia Evaluation  Patient summary reviewed and Nursing notes reviewed no history of anesthetic complications:   Airway: Mallampati: IV     Neck ROM: full   Dental:          Pulmonary:Negative Pulmonary ROS and normal exam    (+) sleep apnea:                             Cardiovascular:Negative CV ROS    (+) hypertension:,                   Neuro/Psych:   Negative Neuro/Psych ROS  (+) psychiatric history:            GI/Hepatic/Renal: Neg GI/Hepatic/Renal ROS  (+) renal disease: kidney stones,      (-) hiatal hernia and GERD       Endo/Other: Negative Endo/Other ROS                    Abdominal:           Vascular:                                      Anesthesia Plan      general     ASA 3     (I discussed with the patient the risks and benefits of PIV, general anesthesia, IV Narcotics, PACU. All questions were answered the patient agrees with the plan and wishes to proceed.  )  Induction: intravenous. Pre-Operative Diagnosis: Hydronephrosis with renal calculous obstruction [N13.2]    45 y.o.   BMI:  Body mass index is 40.17 kg/m².      Vitals:    09/21/20 0612 09/21/20 0705 09/21/20 0813 09/21/20 0930   BP: 139/79 (!) 173/136 (!) 152/88    Pulse: 80 83 81    Resp: 16 16 18    Temp:   97.2 °F (36.2 °C)    TempSrc:   Oral    SpO2: 98% 95% 97%    Weight:    288 lb (130.6 kg)   Height:           Allergies   Allergen Reactions    Amoxicillin Nausea Only    Erythromycin     Pcn [Penicillins] Hives       Social History     Tobacco Use    Smoking status: Current Every Day Smoker     Packs/day: 1.00     Years: 10.00     Pack years: 10.00 Types: Cigarettes     Last attempt to quit: 2013     Years since quittin.6    Smokeless tobacco: Current User     Types: Chew    Tobacco comment: 19 1 pack every 4 days   Substance Use Topics    Alcohol use: Yes     Comment: till 1 mo.  ago was drinking every night        LABS:    CBC  Lab Results   Component Value Date/Time    WBC 14.2 (H) 2020 01:05 AM    HGB 14.6 2020 01:05 AM    HCT 43.4 2020 01:05 AM     2020 01:05 AM     RENAL  Lab Results   Component Value Date/Time     (L) 2020 01:05 AM    K 4.2 2020 01:05 AM    CL 97 (L) 2020 01:05 AM    CO2 20 (L) 2020 01:05 AM    BUN 13 2020 01:05 AM    CREATININE 1.0 2020 01:05 AM    GLUCOSE 163 (H) 2020 01:05 AM     COAGS  No results found for: PROTIME, INR, APTT    Cintia Hannah MD   2020

## 2020-09-21 NOTE — ED NOTES
Report given to [facility]. Patient VSS.  Patient transported to floor with belongings and NS infusing     Kumar House RN  09/21/20 4889

## 2020-09-21 NOTE — FLOWSHEET NOTE
09/21/20 1445   Vital Signs   Temp 97.4 °F (36.3 °C)   Temp Source Oral   Pulse 84   Heart Rate Source Monitor   Resp 18   BP (!) 147/95   BP Location Left lower arm   BP Upper/Lower Lower   Patient Position Semi fowlers   Level of Consciousness 0   MEWS Score 1   Patient Currently in Pain Yes   Oxygen Therapy   SpO2 91 %   O2 Device None (Room air)   Patient arrived to unit in stable condition, post op vitals started. No needs noted at this time, will continue to monitor.

## 2020-09-21 NOTE — PLAN OF CARE
44 yo M w/ R flank pain in presence of known 6 mm obstructing R ureteral renal calculi w/ R hydro. He has had associated N/V.   Has seen Dr. Ariadna Lovett in the past.

## 2020-09-21 NOTE — ED NOTES
[facility] [address] [place] [facility] at [phone] when pt gets a room.      Donald Mcgill RN  09/21/20 6806

## 2020-09-21 NOTE — CONSULTS
9/21/2020  Virgie Bernstein    Reason for Consult:  Right flank pain  Requesting Physician:  Cyndy Kenny      History Obtained From:  patient, electronic medical record    HISTORY OF PRESENT ILLNESS:                The patient is a 45 y.o. male who presents with Right flank pain. He was seen and admitted through the ER. His CT showed a large upper ureteral stone with high grade obstruction.     Past Medical History:        Diagnosis Date    Hypertension     Kidney stones     MRSA (methicillin resistant Staphylococcus aureus) 12/10/12    SAAD (obstructive sleep apnea)      Past Surgical History:        Procedure Laterality Date    OTHER SURGICAL HISTORY  12/13/12    Wide Excision of Nonhealing Cysts Right Axilla and Bilateral Upper Inner Thighs    OTHER SURGICAL HISTORY  09/25/2013    cystoscopy, right retrograde, ureteroscopy, Holmium Laser lithotripsy, stone extraction    OTHER SURGICAL HISTORY  09/21/2020    cystoscopy with stone manipulation right stemnt placement     Current Medications:   Current Facility-Administered Medications: famotidine (PEPCID) tablet 20 mg, 20 mg, Oral, BID  metoprolol tartrate (LOPRESSOR) tablet 12.5 mg, 12.5 mg, Oral, BID  tamsulosin (FLOMAX) capsule 0.4 mg, 0.4 mg, Oral, Daily  0.9 % sodium chloride infusion, , Intravenous, Continuous  sodium chloride flush 0.9 % injection 10 mL, 10 mL, Intravenous, 2 times per day  sodium chloride flush 0.9 % injection 10 mL, 10 mL, Intravenous, PRN  acetaminophen (TYLENOL) tablet 650 mg, 650 mg, Oral, Q6H PRN **OR** acetaminophen (TYLENOL) suppository 650 mg, 650 mg, Rectal, Q6H PRN  polyethylene glycol (GLYCOLAX) packet 17 g, 17 g, Oral, Daily PRN  promethazine (PHENERGAN) tablet 12.5 mg, 12.5 mg, Oral, Q6H PRN **OR** ondansetron (ZOFRAN) injection 4 mg, 4 mg, Intravenous, Q6H PRN  enoxaparin (LOVENOX) injection 40 mg, 40 mg, Subcutaneous, Daily  glucose (GLUTOSE) 40 % oral gel 15 g, 15 g, Oral, PRN  dextrose 50 % IV solution, 12.5 g, Intravenous, PRN  glucagon (rDNA) injection 1 mg, 1 mg, Intramuscular, PRN  dextrose 5 % solution, 100 mL/hr, Intravenous, PRN  insulin lispro (HUMALOG) injection vial 0-6 Units, 0-6 Units, Subcutaneous, Q4H  potassium chloride (KLOR-CON M) extended release tablet 40 mEq, 40 mEq, Oral, PRN **OR** potassium bicarb-citric acid (EFFER-K) effervescent tablet 40 mEq, 40 mEq, Oral, PRN **OR** potassium chloride 10 mEq/100 mL IVPB (Peripheral Line), 10 mEq, Intravenous, PRN  magnesium sulfate 2 g in 50 mL IVPB premix, 2 g, Intravenous, PRN  nicotine (NICODERM CQ) 7 MG/24HR 1 patch, 1 patch, Transdermal, Daily  ketorolac (TORADOL) injection 15 mg, 15 mg, Intravenous, Q6H PRN  HYDROmorphone (DILAUDID) injection 0.5 mg, 0.5 mg, Intravenous, Q3H PRN **OR** HYDROmorphone (DILAUDID) injection 1 mg, 1 mg, Intravenous, Q3H PRN  cefTRIAXone (ROCEPHIN) 1 g IVPB in 50 mL D5W minibag, 1 g, Intravenous, Q24H  HYDROmorphone (DILAUDID) injection 0.25 mg, 0.25 mg, Intravenous, Q5 Min PRN  HYDROmorphone (DILAUDID) injection 0.5 mg, 0.5 mg, Intravenous, Q5 Min PRN  morphine injection 1 mg, 1 mg, Intravenous, Q5 Min PRN  morphine injection 2 mg, 2 mg, Intravenous, Q5 Min PRN  oxyCODONE-acetaminophen (PERCOCET) 5-325 MG per tablet 1 tablet, 1 tablet, Oral, PRN **OR** oxyCODONE-acetaminophen (PERCOCET) 5-325 MG per tablet 2 tablet, 2 tablet, Oral, PRN  diphenhydrAMINE (BENADRYL) injection 12.5 mg, 12.5 mg, Intravenous, Once PRN  ondansetron (ZOFRAN) injection 4 mg, 4 mg, Intravenous, PRN  promethazine (PHENERGAN) injection 6.25 mg, 6.25 mg, Intravenous, Q15 Min PRN  labetalol (NORMODYNE;TRANDATE) injection 5 mg, 5 mg, Intravenous, Q10 Min PRN  hydrALAZINE (APRESOLINE) injection 5 mg, 5 mg, Intravenous, Q10 Min PRN  meperidine (DEMEROL) injection 12.5 mg, 12.5 mg, Intravenous, Q5 Min PRN  sterile water for irrigation, , , PRN  lidocaine (XYLOCAINE) 2 % uro-jet, , , PRN  Allergies:     Allergies Allergen Reactions    Amoxicillin Nausea Only    Erythromycin     Pcn [Penicillins] Hives     Social History:  Reviewed, non contributory    Family History:  Reviewed, non contributory      REVIEW OF SYSTEMS:    12 point ROS has been completed and reviewed    PHYSICAL EXAM:    VITALS:  /89   Pulse 96   Temp 97.7 °F (36.5 °C) (Temporal)   Resp 12   Ht 5' 11\" (1.803 m)   Wt 288 lb (130.6 kg)   SpO2 96%   BMI 40.17 kg/m²   H&N: Sclera normal, no masses, trachea midline, no bruit  CVS: Normal rate and rhythm, no murmurs or rubs, peripheral pulses equal, no clubbing or cyanosis. RESP: Breath sounds equal bilateral, few rhonchi. ABDO: Soft, non-tender, bowel sounds active, no organomegaly, no hernias. LYMPH:  No lymphadenopathy. Skin: Warm dry and intact. : Right CVAT, normal external genitalia, no discharge. MSK: Grossly normal for patient  BEST: Grossly normal for patient  PSY: No acute changes noted in psychosocial assessment.     DATA:    CBC:   Lab Results   Component Value Date    WBC 14.2 09/21/2020    RBC 4.97 09/21/2020    RBC 4.90 08/17/2016    HGB 14.6 09/21/2020    HCT 43.4 09/21/2020    MCV 87.2 09/21/2020    MCH 29.3 09/21/2020    MCHC 33.6 09/21/2020    RDW 13.6 09/21/2020     09/21/2020    MPV 7.8 09/21/2020     BMP:    Lab Results   Component Value Date     09/21/2020    K 4.2 09/21/2020    CL 97 09/21/2020    CO2 20 09/21/2020    BUN 13 09/21/2020    LABALBU 4.6 09/21/2020    CREATININE 1.0 09/21/2020    CALCIUM 9.6 09/21/2020    GFRAA >60 09/21/2020    GFRAA >60 01/23/2013    LABGLOM >60 09/21/2020    GLUCOSE 163 09/21/2020     U/A:    Lab Results   Component Value Date    COLORU Yellow 09/21/2020    PROTEINU Negative 09/21/2020    PHUR 7.0 09/21/2020    WBCUA 3-5 09/21/2020    RBCUA  09/21/2020    MUCUS 2+ 09/21/2020    BACTERIA Rare 09/21/2020    CLARITYU Clear 09/21/2020    SPECGRAV 1.020 09/21/2020    LEUKOCYTESUR Negative 09/21/2020    UROBILINOGEN 0.2 09/21/2020    BILIRUBINUR Negative 09/21/2020    BLOODU LARGE 09/21/2020    GLUCOSEU Negative 09/21/2020    AMORPHOUS 1+ 09/21/2020     CT reviewed - see report    IMPRESSION/RECOMMENDATIONS:      Right renal colic and ureteral obstruction. Patient will need to be taken to surgery for a cysto and stent placement, possible right ureteroscopy. He may require additional procedures such as ESWL. Thank you for asking me to see this interesting patient.     VERITO Sherwood

## 2020-09-21 NOTE — H&P
Admission H & P    Clara Horowitz;  MRN: [id number] ; Admit Date: 9/21/2020  1:00 AM   Current Date and Time: 9/21/2020 8:57 AM    PCP  --  RAJINDER Newell - CNP         Chief Complaint   Patient presents with    Flank Pain     pt c/o right flank pain that started about 2030, pt has hx of stones, pt was here thursday, pt scheduled surgery for this week         HPI:  Patient is a 45 y.o.  male  With known h.o  Renal stones with  presents with right flank pain since Thursday . Onset of symptoms was gradual 4 days ago with gradually worsening course since that time. The pain is located in right flank . Patient describes the pain as sharp and spasmodic  continuous and rated as moderate. Pain has been associated with nausea, seen in Er. Given antiemetics, pain meds and flomax, unable to pass stone and comes back with worsening pain and emesis    Pt seen in painful distress in bed  Denies any fevers, chills or hematuria    Allergies   Allergen Reactions    Amoxicillin Nausea Only    Erythromycin     Pcn [Penicillins] Hives       Past Medical History:   Diagnosis Date    Hypertension     Kidney stones     MRSA (methicillin resistant Staphylococcus aureus) 12/10/12    SAAD (obstructive sleep apnea)       Past Surgical History:   Procedure Laterality Date    OTHER SURGICAL HISTORY  12/13/12    Wide Excision of Nonhealing Cysts Right Axilla and Bilateral Upper Inner Thighs    OTHER SURGICAL HISTORY  09/25/2013    cystoscopy, right retrograde, ureteroscopy, Holmium Laser lithotripsy, stone extraction      Medications Prior to Admission: HYDROcodone-acetaminophen (NORCO) 5-325 MG per tablet, Take 1 tablet by mouth every 8 hours as needed for Pain for up to 3 days. Intended supply: 3 days.  Take lowest dose possible to manage pain  ondansetron (ZOFRAN ODT) 4 MG disintegrating tablet, Take 1 tablet by mouth every 8 hours as needed for Nausea  tamsulosin (FLOMAX) 0.4 MG capsule, Take 1 capsule by mouth daily for 5 doses  ibuprofen (IBU) 800 MG tablet, Take 1 tablet by mouth every 8 hours as needed for Pain  famotidine (PEPCID) 20 MG tablet, Take 20 mg by mouth 2 times daily  TRULICITY 6.69 ZV/7.4FD SOPN,   metFORMIN (GLUCOPHAGE) 1000 MG tablet,   naproxen (NAPROSYN) 375 MG tablet, Take 1 tablet by mouth 2 times daily for 10 days  lisinopril (PRINIVIL;ZESTRIL) 20 MG tablet, Take 1 tablet by mouth daily (Patient taking differently: Take 10 mg by mouth daily )  metoprolol (LOPRESSOR) 25 MG tablet, Take 1 tablet by mouth 2 times daily. (Patient taking differently: Take 12.5 mg by mouth 2 times daily )  Allergies   Allergen Reactions    Amoxicillin Nausea Only    Erythromycin     Pcn [Penicillins] Hives      Social History     Tobacco Use    Smoking status: Current Every Day Smoker     Packs/day: 1.00     Years: 10.00     Pack years: 10.00     Types: Cigarettes     Last attempt to quit: 2013     Years since quittin.6    Smokeless tobacco: Current User     Types: Chew    Tobacco comment: 19 1 pack every 4 days   Substance Use Topics    Alcohol use: Yes     Comment: till 1 mo.  ago was drinking every night       Family History   Problem Relation Age of Onset    Diabetes Mother     Asthma Father     COPD Father           Review of systems      Constitutional: Negative for fever or chills  HENT: Negative for sore throat   Eyes: Negative for redness   Respiratory: Negative  for dyspnea, cough   Cardiovascular: Negative for chest pain   Gastrointestinal:neg for abd pain, nausea or vomiting or diarrhea  No melena or BRPR  Genitourinary: Negative for hematuria - see above  Musculoskeletal: Negative for arthralgias   Skin: Negative for rash   Neurological: Negative for syncope   Hematological: Negative for adenopathy   Psychiatric/Behavorial: Negative for anxiety      Objective:     VS: Temp: 97.8 °F (36.6 °C)  Pulse: 92  BP: (!) 154/95  SpO2: 98 %        Physical Exam:  General: obese middle aged male, Awake, alert and oriented. Appears to be in painful distress  Mucous Membranes:  Pink , anicteric  Neck: No JVD, no carotid bruit, no thyromegaly  Chest:  Clear to auscultation bilaterally, no added sounds  Cardiovascular:  RRR S1S2 heard, no murmurs or gallops  Abdomen:  Soft, obese, undistended, non tender, no organomegaly, BS present  Right flank pain with cva tenderness  Extremities: No edema or cyanosis. Distal pulses well felt  Neurological : no focal deficits        LABS:  CBC:  Lab Results   Component Value Date    WBC 14.2 09/21/2020    HGB 14.6 09/21/2020    HCT 43.4 09/21/2020    MCV 87.2 09/21/2020     09/21/2020    NEUTOPHILPCT 78.2 09/21/2020    LYMPHOPCT 14.3 09/21/2020    LYMPHOPCT 30.0 08/17/2016    MONOPCT 5.6 09/21/2020    BASOPCT 0.7 09/21/2020    NEUTROABS 11.1 09/21/2020    LYMPHSABS 2.0 09/21/2020    MONOSABS 0.8 09/21/2020    EOSABS 0.2 09/21/2020    BASOSABS 0.1 09/21/2020     BMP:   Lab Results   Component Value Date     09/21/2020    K 4.2 09/21/2020    CO2 20 09/21/2020    BUN 13 09/21/2020    CREATININE 1.0 09/21/2020    CALCIUM 9.6 09/21/2020     Coagulation: No results found for: INR, APTT  Cardiac markers: No results found for: CKMB, CKTOTAL, TROPONINI, MYOGLOBIN  ABGs: No results found for: POCPH, POCPCO2, POCPO2, POCHCO3, POCTCO2, POCFIO2    Lab Results   Component Value Date    ALT 42 (H) 09/21/2020    AST 20 09/21/2020    ALKPHOS 103 09/21/2020    BILITOT 0.3 09/21/2020             Ct abd -   6 cm stone in the proximal right ureter with mild right hydronephrosis. Additional 4 mm smaller nonobstructing stones right kidney.         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         Fatty liver     US renal       Mild right-sided hydronephrosis, overall similar in appearance compared to    prior CT when accounting for differences in modality.  The patient's known    nephrolithiasis is unable to be visualized on ultrasound due to technical    limitations.

## 2020-09-21 NOTE — ED PROVIDER NOTES
SAAD (obstructive sleep apnea)      Past Surgical History:   Procedure Laterality Date    OTHER SURGICAL HISTORY  12    Wide Excision of Nonhealing Cysts Right Axilla and Bilateral Upper Inner Thighs    OTHER SURGICAL HISTORY  2013    cystoscopy, right retrograde, ureteroscopy, Holmium Laser lithotripsy, stone extraction     Family History   Problem Relation Age of Onset    Diabetes Mother     Asthma Father     COPD Father      Social History     Socioeconomic History    Marital status:      Spouse name: Not on file    Number of children: Not on file    Years of education: Not on file    Highest education level: Not on file   Occupational History    Not on file   Social Needs    Financial resource strain: Not on file    Food insecurity     Worry: Not on file     Inability: Not on file    Transportation needs     Medical: Not on file     Non-medical: Not on file   Tobacco Use    Smoking status: Current Every Day Smoker     Packs/day: 1.00     Years: 10.00     Pack years: 10.00     Types: Cigarettes     Last attempt to quit: 2013     Years since quittin.6    Smokeless tobacco: Current User     Types: Chew    Tobacco comment: 19 1 pack every 4 days   Substance and Sexual Activity    Alcohol use: Yes     Comment: till 1 mo.  ago was drinking every night     Drug use: No     Comment: 6-7 yrs ago    Sexual activity: Yes     Partners: Female   Lifestyle    Physical activity     Days per week: Not on file     Minutes per session: Not on file    Stress: Not on file   Relationships    Social connections     Talks on phone: Not on file     Gets together: Not on file     Attends Restorationism service: Not on file     Active member of club or organization: Not on file     Attends meetings of clubs or organizations: Not on file     Relationship status: Not on file    Intimate partner violence     Fear of current or ex partner: Not on file     Emotionally abused: Not on file without pain or stiffness  EYES: PERRL. EOM's grossly intact. HEART/CHEST: RRR. No murmurs. LUNGS: Respirations unlabored. CTAB. Good air exchange. Speaking comfortably in full sentences. ABDOMEN: Right-sided CVA tenderness. soft. Non-distended. No masses. No organomegaly. No guarding or rebound. MUSCULOSKELETAL: No extremity edema. Compartments soft. No deformity. No tenderness in the extremities. All extremities neurovascularly intact. SKIN: Warm and dry. No acute rashes. NEUROLOGICAL: Alert and oriented. CN's 2-12 intact. No gross facial drooping. Strength 5/5, sensation intact. PSYCHIATRIC: Anxious    LABS  I have reviewed all labs for this visit.    Results for orders placed or performed during the hospital encounter of 09/21/20   CBC Auto Differential   Result Value Ref Range    WBC 14.2 (H) 4.0 - 11.0 K/uL    RBC 4.97 4.20 - 5.90 M/uL    Hemoglobin 14.6 13.5 - 17.5 g/dL    Hematocrit 43.4 40.5 - 52.5 %    MCV 87.2 80.0 - 100.0 fL    MCH 29.3 26.0 - 34.0 pg    MCHC 33.6 31.0 - 36.0 g/dL    RDW 13.6 12.4 - 15.4 %    Platelets 971 795 - 132 K/uL    MPV 7.8 5.0 - 10.5 fL    Neutrophils % 78.2 %    Lymphocytes % 14.3 %    Monocytes % 5.6 %    Eosinophils % 1.2 %    Basophils % 0.7 %    Neutrophils Absolute 11.1 (H) 1.7 - 7.7 K/uL    Lymphocytes Absolute 2.0 1.0 - 5.1 K/uL    Monocytes Absolute 0.8 0.0 - 1.3 K/uL    Eosinophils Absolute 0.2 0.0 - 0.6 K/uL    Basophils Absolute 0.1 0.0 - 0.2 K/uL   Comprehensive Metabolic Panel w/ Reflex to MG   Result Value Ref Range    Sodium 133 (L) 136 - 145 mmol/L    Potassium reflex Magnesium 4.2 3.5 - 5.1 mmol/L    Chloride 97 (L) 99 - 110 mmol/L    CO2 20 (L) 21 - 32 mmol/L    Anion Gap 16 3 - 16    Glucose 163 (H) 70 - 99 mg/dL    BUN 13 7 - 20 mg/dL    CREATININE 1.0 0.9 - 1.3 mg/dL    GFR Non-African American >60 >60    GFR African American >60 >60    Calcium 9.6 8.3 - 10.6 mg/dL    Total Protein 7.1 6.4 - 8.2 g/dL    Alb 4.6 3.4 - 5.0 g/dL User Index  [ER] Mariel Tse MD        During the patient's ED course, the patient was given:  Medications   ondansetron [facility]) injection 4 mg (4 mg Intravenous Given 9/21/20 0117)   morphine (PF) injection 2 mg (2 mg Intravenous Given 9/21/20 0348)   0.9 % sodium chloride infusion ( Intravenous New Bag 9/21/20 0352)   ketorolac (TORADOL) injection 15 mg (15 mg Intravenous Given 9/21/20 0117)   0.9 % sodium chloride bolus (0 mLs Intravenous Stopped 9/21/20 0244)        MDM    Work-up remarkable for abdominal x-ray that shows 6 mm calcification that does not appear to have migrated from previous CT evaluation. Low suspicion that patient will be able to pass stone without surgical intervention. Ultrasound shows mild hydronephrosis that is consistent with previous. Patient does not have evidence of kidney dysfunction. Urinalysis showed blood but no evidence of infection. Patient does have leukocytosis, this may be related to his significant pain and vomiting. On reassessment 2 hours after initial treatment with Toradol and morphine, patient is having increase in pain. Patient states that he was not tolerating symptoms at home despite narcotics prescription. Per patient report, urology does not plan to intervene. Based off my discussion with urology, even though patient does not have a complicated UTI, he appears to be failing outpatient management of his kidney stone. We will plan to bring the patient into the hospital for pain control and evaluation by urology for acute management of his kidney stone. Liver function testing unremarkable. Low suspicion for hepatitis or other acute liver pathology. Lipase within normal limits. Low suspicion for pancreatitis. Minor electrolyte abnormalities. Patient receiving fluids. No anemia or thrombocytopenia. At this time, do feel the patient requires admission for further work-up and management.       I estimate there is LOW risk for ACUTE APPENDICITIS, BOWEL OBSTRUCTION, ACUTE CHOLECYSTITIS, RUPTURED DIVERTICULITIS, INCARCERATED or STRANGULATED HERNIA, HEMMORHAGIC PANCREATITIS, PERFORATED BOWEL/ULCER. CLINICAL IMPRESSION  1. Kidney stone    2. Intractable pain    3. Hydronephrosis with urinary obstruction due to ureteral calculus        Blood pressure (!) 148/119, pulse 80, temperature 97.8 °F (36.6 °C), temperature source Oral, resp. rate 16, height 5' 11\" (1.803 m), weight 295 lb (133.8 kg), SpO2 92 %. DISPOSITION  Marlene Puente was admitted in stable condition. DISCLAIMER: This chart was created using Dragon dictation software. Efforts were made by me to ensure accuracy, however some errors may be present due to limitations of this technology and occasionally words are not transcribed correctly.       Gissel White MD  09/21/20 [address], [place]  09/21/20 2913

## 2020-09-21 NOTE — PROGRESS NOTES
Arrived from OR awakens easily and no drainage noted from meatus.   Report received from Courtney Clemente RN

## 2020-09-21 NOTE — ANESTHESIA POSTPROCEDURE EVALUATION
Department of Anesthesiology  Postprocedure Note    Patient: Diamond Garcia  MRN: [id number]  YOB: 1975  Date of evaluation: 9/21/2020  Time:  2:52 PM     Procedure Summary     Date:  09/21/20 Room / Location:  [facility]    Anesthesia Start:  1099 Anesthesia Stop:  5495    Procedure:  CYSTOSCOPY WITH STONE [address] [facility] (Right Bladder) Diagnosis:  (STONE)    Surgeon:  Tequila Ames MD Responsible Provider:  Joo Godwin MD    Anesthesia Type:  general ASA Status:  3          Anesthesia Type: general    Claudia Phase I: Claudia Score: 9    Claudia Phase II:      Last vitals: Reviewed and per EMR flowsheets.        Anesthesia Post Evaluation    Comments: Postoperative Anesthesia Note    Name:    Diamond Garcia  MRN:      [id number]    Patient Vitals in the past 12 hrs:  09/21/20 1445, BP:(!) 147/95, Temp:97.4 °F (36.3 °C), Temp src:Oral, Pulse:84, Resp:18, SpO2:91 %  09/21/20 1438, BP:136/74, Pulse:84, Resp:16, SpO2:92 %  09/21/20 1429, BP:136/74, Pulse:88, Resp:19, SpO2:92 %  09/21/20 1415, BP:135/87, Temp:98.1 °F (36.7 °C), Temp src:Temporal, Pulse:91, Resp:19, SpO2:95 %  09/21/20 1411, BP:135/87, Pulse:86, Resp:12, SpO2:96 %  09/21/20 1356, BP:136/78, Pulse:91, Resp:14, SpO2:95 %  09/21/20 1352, BP:135/82, Pulse:94, Resp:22, SpO2:90 %  09/21/20 1347, BP:136/80, Pulse:99, Resp:23, SpO2:(!) 88 %  09/21/20 1342, BP:134/89, Temp:97.7 °F (36.5 °C), Temp src:Temporal, Pulse:96, Resp:12, SpO2:96 %  09/21/20 0930, Weight:288 lb (130.6 kg)  09/21/20 0813, BP:(!) 152/88, Temp:97.2 °F (36.2 °C), Temp src:Oral, Pulse:81, Resp:18, SpO2:97 %  09/21/20 0705, BP:(!) 173/136, Pulse:83, Resp:16, SpO2:95 %  09/21/20 0612, BP:139/79, Pulse:80, Resp:16, SpO2:98 %  09/21/20 0516, BP:(!) 141/83, Pulse:88, Resp:18, SpO2:95 %  09/21/20 0400, Height:5' 11\" (1.803 m), Weight:295 lb (133.8 kg)  09/21/20 0359, BP:(!) 148/119, Pulse:80, Resp:16, SpO2:92 % LABS:    CBC  Lab Results       Component                Value               Date/Time                  WBC                      14.2 (H)            09/21/2020 01:05 AM        HGB                      14.6                09/21/2020 01:05 AM        HCT                      43.4                09/21/2020 01:05 AM        PLT                      376                 09/21/2020 01:05 AM   RENAL  Lab Results       Component                Value               Date/Time                  NA                       133 (L)             09/21/2020 01:05 AM        K                        4.2                 09/21/2020 01:05 AM        CL                       97 (L)              09/21/2020 01:05 AM        CO2                      20 (L)              09/21/2020 01:05 AM        BUN                      13                  09/21/2020 01:05 AM        CREATININE               1.0                 09/21/2020 01:05 AM        GLUCOSE                  163 (H)             09/21/2020 01:05 AM   COAGS  No results found for: PROTIME, INR, APTT    Intake & Output:  @24HRIO@    Nausea & Vomiting:  No    Level of Consciousness:  Awake    Pain Assessment:  Adequate analgesia    Anesthesia Complications:  No apparent anesthetic complications    SUMMARY      Vital signs stable  OK to discharge from Stage I post anesthesia care.   Care transferred from Anesthesiology department on discharge from perioperative area

## 2020-09-22 LAB
ESTIMATED AVERAGE GLUCOSE: 142.7 MG/DL
HBA1C MFR BLD: 6.6 %

## 2020-09-22 NOTE — OP NOTE
[address]                 [address] [facility], [address]                                OPERATIVE REPORT    PATIENT NAME: Jayla Mcintyre                     :        1975  MED REC NO:   [id number]                          ROOM:       0215  ACCOUNT NO:   [de-identified]                           ADMIT DATE: 2020  PROVIDER:     Greer Terrell MD    DATE OF PROCEDURE:  2020    PREOPERATIVE DIAGNOSIS:  Right ureteral calculus. POSTOPERATIVE DIAGNOSIS:  Right ureteral calculus. OPERATIONS PERFORMED:  Cystoscopy with right ureteral stent placement  and stone manipulation. PRIMARY SURGEON:  Greer Terrell MD    ANESTHESIA:  General.    ESTIMATED BLOOD LOSS:  5 mL. OPERATIVE FINDINGS:  Upper ureteral stone on the right. HISTORY AND INDICATIONS:  This is a 24-year-old white male, who had  presented to the hospital with acute onset of right flank pain,  diagnosed with a large upper ureteral stone and had been admitted to the  medical service. Urologic consultation was obtained, he was scheduled  for a cystoscopy with possible ureteroscopy, stone manipulation, and  stent placement. Risks, benefits, and expected outcomes of the  procedure have been discussed. PROCEDURE IN DETAIL:  After obtaining the informed consent, the patient  was taken to the operative suite. He was given a general anesthetic and  placed on the operative table in a modified dorsal lithotomy position. Prepping and draping were done in sterile fashion. Cystourethroscopy  was then performed both 30-and 70-degree lenses. Mild bilobar  hyperplasia was noted in the prostatic fossa. In bladder, there was  some mild trabeculation, but no evidence for any bladder cancer, tumors,  or stones. Good efflux was seen on the left side, but minimal efflux  and bloody efflux were seen on the right.     Under fluoroscopic guidance, which was used throughout this

## 2020-09-22 NOTE — DISCHARGE SUMMARY
Physician Discharge Summary        Patient ID:  Khari Velazquez  [id number]  89 y.o.  1975    Admit date: 9/21/2020    Discharge date and time: 9/21/2020  5:00 PM     Admitting Physician: Radha Soliman MD     Discharge Physician: Fiordaliza Olguin    Admission Diagnoses: Hydronephrosis with renal calculous obstruction [N13.2]    Discharge Diagnoses: Hydronephrosis with renal calculous obstruction [N13.2]    Discharged Condition: good      Hospital Course: Patient admitted for renal colic and a kidney stone. Stone management protocal was used and the patient is recovering. Management included surgical removal conservative management. Patient with successful voiding trial prior to discharge. Instructions for post op care and follow up were reviewed. Physical exam at the time of discharge was unremarkable, no post operative complications noted. Discharge Exam:   Physical exam at the time of discharge was unremarkable, no post operative complications noted. Disposition: home    Patient Instructions:      Medication List      START taking these medications    oxyCODONE-acetaminophen 5-325 MG per tablet  Commonly known as:  Percocet  Take 0.5 tablets by mouth every 4 hours as needed for Pain for up to 5 days. Notes to patient:  Oxycontin(Oxycodone)  Use:  pain    Side effects: sleepiness, constipation     phenazopyridine 200 MG tablet  Commonly known as:  Pyridium  Take 1 tablet by mouth 3 times daily as needed for Pain  Notes to patient:  Phenazopyridine (Pyridium®)  Use: ease pain from a bladder infection. Side effects: change color of urine/ stool to orange or     yellow, stain contacts, headache,belly pain     sulfamethoxazole-trimethoprim 400-80 MG per tablet  Commonly known as: Bactrim  Take 1 tablet by mouth 2 times daily for 4 days  Notes to patient:  Sulfamethoxazole/Trimethoprim Emily Salazar®)   Use: treat infections. Side effects: upset stomach, diarrhea. CHANGE how you take these medications    lisinopril 20 MG tablet  Commonly known as:  PRINIVIL;ZESTRIL  Take 1 tablet by mouth daily  What changed:  how much to take     metoprolol tartrate 25 MG tablet  Commonly known as:  LOPRESSOR  Take 1 tablet by mouth 2 times daily. What changed:  how much to take        CONTINUE taking these medications    famotidine 20 MG tablet  Commonly known as:  PEPCID     ibuprofen 800 MG tablet  Commonly known as:  IBU  Take 1 tablet by mouth every 8 hours as needed for Pain     metFORMIN 1000 MG tablet  Commonly known as:  GLUCOPHAGE     naproxen 375 MG tablet  Commonly known as:  Naprosyn  Take 1 tablet by mouth 2 times daily for 10 days     ondansetron 4 MG disintegrating tablet  Commonly known as:  Zofran ODT  Take 1 tablet by mouth every 8 hours as needed for Nausea     tamsulosin 0.4 MG capsule  Commonly known as:  Flomax  Take 1 capsule by mouth daily for 5 doses     Trulicity 3.50 YN/0.5BW Sopn  Generic drug:  Dulaglutide        ASK your doctor about these medications    HYDROcodone-acetaminophen 5-325 MG per tablet  Commonly known as:  Norco  Take 1 tablet by mouth every 8 hours as needed for Pain for up to 3 days. Intended supply: 3 days. Take lowest dose possible to manage pain  Ask about: Should I take this medication? Where to Get Your Medications      You can get these medications from any pharmacy    Bring a paper prescription for each of these medications  · oxyCODONE-acetaminophen 5-325 MG per tablet  · phenazopyridine 200 MG tablet  · sulfamethoxazole-trimethoprim 400-80 MG per tablet       Activity: no lifting, or Strenuous exercise for 1 week. Diet: regular diet    Follow-up with Dr. Rody Reyes in 1-2 weeks.     Mindy Vela  9/22/2020  12:13 PM

## 2020-10-01 NOTE — TELEPHONE ENCOUNTER
CPAP compliance report from 8/24/2020-9/22/2020 on CPAP 10 cm H2O reviewed. Compliance is good 93%. AHI is good 0.4.   Please make patient aware

## 2020-10-21 ENCOUNTER — OFFICE VISIT (OUTPATIENT)
Dept: ENDOCRINOLOGY | Age: 45
End: 2020-10-21
Payer: COMMERCIAL

## 2020-10-21 VITALS
SYSTOLIC BLOOD PRESSURE: 130 MMHG | RESPIRATION RATE: 14 BRPM | TEMPERATURE: 97.3 F | WEIGHT: 290 LBS | BODY MASS INDEX: 40.6 KG/M2 | HEART RATE: 92 BPM | DIASTOLIC BLOOD PRESSURE: 81 MMHG | HEIGHT: 71 IN | OXYGEN SATURATION: 97 %

## 2020-10-21 PROBLEM — E66.01 CLASS 3 SEVERE OBESITY WITH BODY MASS INDEX (BMI) OF 40.0 TO 44.9 IN ADULT (HCC): Status: ACTIVE | Noted: 2020-10-21

## 2020-10-21 PROBLEM — E66.813 CLASS 3 SEVERE OBESITY WITH BODY MASS INDEX (BMI) OF 40.0 TO 44.9 IN ADULT: Status: ACTIVE | Noted: 2020-10-21

## 2020-10-21 PROBLEM — D49.7 ADRENAL TUMOR: Status: ACTIVE | Noted: 2020-10-21

## 2020-10-21 PROCEDURE — 99204 OFFICE O/P NEW MOD 45 MIN: CPT | Performed by: INTERNAL MEDICINE

## 2020-10-21 RX ORDER — HYDROCODONE BITARTRATE AND ACETAMINOPHEN 5; 325 MG/1; MG/1
TABLET ORAL
COMMUNITY
Start: 2020-10-16 | End: 2021-07-07

## 2020-10-21 RX ORDER — LANCETS 28 GAUGE
EACH MISCELLANEOUS
COMMUNITY
Start: 2020-09-20 | End: 2021-07-07

## 2020-10-21 NOTE — PROGRESS NOTES
SUBJECTIVE:  Henny Francis is a 45 y.o. male who is being evaluated for adrenal tumor     1. Adrenal tumor  This started in 2013. Patient was diagnosed with adrenal tumor. The problem has been gradually worsening. Patient started medication in N/A. Currently patient is on: N/A. Misses N/A doses a month. Current complaints: fatigue, weight gain, depression  4 years ago lost 70 lbs, then put back 80 lbs  Eats healthier  Kidney stones for 15 years, 4 episodes  Used to have spells, but not for 8-9 years. 2. Essential hypertension  HTN   For a while  Occasional headaches   7-8 years    History of obstructive symptoms: difficulty swallowing No, changes in voice/hoarseness No.  History of radiation to patient's neck: No  Resent iodine exposure: No  Family history includes no thyroid abnormalities. Family history of thyroid cancer: No    3. Class 3 severe obesity with serious comorbidity and body mass index (BMI) of 40.0 to 44.9 in adult, unspecified obesity type ([address]  Current complaints: fatigue, weight gain, depression  4 years ago lost 70 lbs, then put back 80 lbs  Eats healthier    4.  Kidney stones  Kidney stones for 15 years, 4 episodes  Severe pain during episodes    Past Medical History:   Diagnosis Date    Difficult airway for intubation 09/21/2020    good view glidescope, difficult to pass tube    Hypertension     Kidney stones     MRSA (methicillin resistant Staphylococcus aureus) 12/10/12    SAAD (obstructive sleep apnea)      Patient Active Problem List    Diagnosis Date Noted    Adrenal tumor 10/21/2020    Class 3 severe obesity with body mass index (BMI) of 40.0 to 44.9 in adult [facility]) 10/21/2020    Hydronephrosis with renal calculous obstruction 09/21/2020    Kidney stone     Intractable pain     Obesity, Class III, BMI 40-49.9 (morbid obesity) ([address] 01/27/2017    Thrombocytosis ([address] 08/17/2016    Severe obstructive sleep apnea 07/20/2016    Hyperglycemia 11/04/2014    Family history of diabetes mellitus 2014    HTN (hypertension) 2014    Attention deficit hyperactivity disorder (ADHD), predominantly inattentive type 2013     Past Surgical History:   Procedure Laterality Date    CYSTOSCOPY Right 2020    CYSTOSCOPY WITH STONE MANIPULIATION,RIGHT STENT PLACEMENT performed by Angelica Mills MD at [address] [place] [address] [place] [place]  [place]    Wide Excision of Nonhealing Cysts Right Axilla and Bilateral Upper Inner Thighs    OTHER SURGICAL HISTORY  2013    cystoscopy, right retrograde, ureteroscopy, Holmium Laser lithotripsy, stone extraction    OTHER SURGICAL HISTORY  2020    cystoscopy with stone manipulation right stemnt placement     Family History   Problem Relation Age of Onset    Diabetes Mother     Asthma Father     COPD Father      Social History     Socioeconomic History    Marital status:      Spouse name: None    Number of children: None    Years of education: None    Highest education level: None   Occupational History    None   Social Needs    Financial resource strain: None    Food insecurity     Worry: None     Inability: None    Transportation needs     Medical: None     Non-medical: None   Tobacco Use    Smoking status: Former Smoker     Packs/day: 1.00     Years: 10.00     Pack years: 10.00     Types: Cigarettes     Last attempt to quit: 2013     Years since quittin.7    Smokeless tobacco: Current User     Types: Chew   Substance and Sexual Activity    Alcohol use: Yes     Comment: till 1 mo.  ago was drinking every night, now rare    Drug use: No     Comment: 6-7 yrs ago    Sexual activity: Yes     Partners: Female   Lifestyle    Physical activity     Days per week: None     Minutes per session: None    Stress: None   Relationships    Social connections     Talks on phone: None     Gets together: None     Attends Taoism service: None     Active member of club or organization: None     Attends meetings of clubs or organizations: None     Relationship status: None    Intimate partner violence     Fear of current or ex partner: None     Emotionally abused: None     Physically abused: None     Forced sexual activity: None   Other Topics Concern    None   Social History Narrative    None     Current Outpatient Medications   Medication Sig Dispense Refill    FreeStyle Lancets MISC       HYDROcodone-acetaminophen (NORCO) 5-325 MG per tablet       ibuprofen (IBU) 800 MG tablet Take 1 tablet by mouth every 8 hours as needed for Pain 20 tablet 0    famotidine (PEPCID) 20 MG tablet Take 20 mg by mouth 2 times daily      TRULICITY 2.59 PT/[id number] SOPN       metFORMIN (GLUCOPHAGE) 1000 MG tablet       lisinopril (PRINIVIL;ZESTRIL) 20 MG tablet Take 1 tablet by mouth daily (Patient taking differently: Take 10 mg by mouth daily ) 90 tablet 0    metoprolol (LOPRESSOR) 25 MG tablet Take 1 tablet by mouth 2 times daily. (Patient taking differently: Take 12.5 mg by mouth 2 times daily ) 180 tablet 1    ondansetron (ZOFRAN ODT) 4 MG disintegrating tablet Take 1 tablet by mouth every 8 hours as needed for Nausea (Patient not taking: Reported on 10/21/2020) 20 tablet 0    tamsulosin (FLOMAX) 0.4 MG capsule Take 1 capsule by mouth daily for 5 doses (Patient not taking: Reported on 10/21/2020) 5 capsule 0    naproxen (NAPROSYN) 375 MG tablet Take 1 tablet by mouth 2 times daily for 10 days 20 tablet 0     No current facility-administered medications for this visit.       Allergies   Allergen Reactions    Amoxicillin Nausea Only    Erythromycin     Pcn [Penicillins] Hives     Family Status   Relation Name Status    Mother  Alive    Father  Alive       Review of Systems:  Constitutional: has fatigue, no fever, has recent weight gain, no recent weight loss, has changes in appetite  Eyes: no eye pain, no change in vision, no eye redness, no eye irritation, no double vision  Ears, nose, throat: no nasal congestion, no sore throat, no earache, no decrease in hearing, no hoarseness, no dry mouth, no sinus problems, no difficulty swallowing, no neck lumps, no dental problems, no mouth sores, no ringing in ears  Pulmonary: no shortness of breath, no wheezing, no dyspnea on exertion, no cough  Cardiovascular: no chest pain, no lower extremity edema, no orthopnea, no intermittent leg claudication, no palpitations  Gastrointestinal: no abdominal pain, no nausea, no vomiting, has diarrhea, has constipation, no dysphagia, has heartburn, no bloating  Genitourinary: no dysuria, no urinary incontinence, no urinary hesitancy, no urinary frequency, no feelings of urinary urgency, no nocturia  Musculoskeletal: no joint swelling, no joint stiffness, no joint pain, no muscle cramps, no muscle pain, no bone pain  Integument/Breast: no hair loss, no skin rashes, no skin lesions, no itching, no dry skin  Neurological: no numbness, no tingling, no weakness, no confusion, no headaches, no dizziness, no fainting, no tremors, no decrease in memory, no balance problems  Psychiatric: has anxiety, has depression, has insomnia  Hematologic/Lymphatic: no tendency for easy bleeding, no swollen lymph nodes, no tendency for easy bruising  Immunology: no seasonal allergies, no frequent infections, no frequent illnesses  Endocrine: no temperature intolerance    /81   Pulse 92   Temp 97.3 °F (36.3 °C)   Resp 14   Ht 5' 11\" (1.803 m)   Wt 290 lb (131.5 kg)   SpO2 97%   BMI 40.45 kg/m²    Wt Readings from Last 3 Encounters:   10/21/20 290 lb (131.5 kg)   09/21/20 288 lb (130.6 kg)   09/17/20 295 lb (133.8 kg)     Body mass index is 40.45 kg/m².     OBJECTIVE:  Constitutional: no acute distress, well appearing and well nourished  Psychiatric: oriented to person, place and time, judgement and insight and normal, recent and remote memory and intact and mood and affect are normal  Skin: skin and subcutaneous tissue is normal without mass, normal turgor  Head and Face: examination of head and face revealed no abnormalities  Eyes: no lid or conjunctival swelling, erythema or discharge, pupils are normal, equal, round, reactive to light  Ears/Nose: external inspection of ears and nose revealed no abnormalities, hearing is grossly normal  Oropharynx/Mouth/Face: lips, tongue and gums are normal with no lesions, the voice quality was normal  Neck: neck is supple and symmetric, with midline trachea and no masses, thyroid is normal  Lymphatics: normal cervical lymph nodes, normal supraclavicular nodes  Pulmonary: no increased work of breathing or signs of respiratory distress, lungs are clear to auscultation  Cardiovascular: normal heart rate and rhythm, normal S1 and S2, no murmurs and pedal pulses and 2+ bilaterally, No edema  Abdomen: abdomen is soft, non-tender with no masses  Musculoskeletal: normal gait and station and exam of the digits and nails are normal  Neurological: normal coordination and normal general cortical function      Lab Review:    Lab Results   Component Value Date    WBC 14.2 09/21/2020    HGB 14.6 09/21/2020    HCT 43.4 09/21/2020    MCV 87.2 09/21/2020     09/21/2020     Lab Results   Component Value Date     09/21/2020    K 4.2 09/21/2020    CL 97 09/21/2020    CO2 20 09/21/2020    BUN 13 09/21/2020    CREATININE 1.0 09/21/2020    GLUCOSE 163 09/21/2020    CALCIUM 9.6 09/21/2020    PROT 7.1 09/21/2020    PROT 7.6 01/23/2013    LABALBU 4.6 09/21/2020    BILITOT 0.3 09/21/2020    ALKPHOS 103 09/21/2020    AST 20 09/21/2020    ALT 42 09/21/2020    LABGLOM >60 09/21/2020    GFRAA >60 09/21/2020    GFRAA >60 01/23/2013    AGRATIO 1.8 09/21/2020    GLOB 2.5 09/21/2020     Lab Results   Component Value Date    TSH 0.56 01/23/2013     Lab Results   Component Value Date    LABA1C 6.6 09/21/2020     Lab Results   Component Value Date    .7 09/21/2020     No results found for: CHOL  No results found for: TRIG  No results found for: HDL  No results found for: LDLCHOLESTEROL, LDLCALC  No results found for: LABVLDL, VLDL  No results found for: [facility]  Lab Results   Component Value Date    LABMICR YES 09/21/2020     No results found for: VITD25     ASSESSMENT/PLAN:  1. Adrenal tumor  Patient is aware that adrenal mass was diagnosed in 2013. At that time it measured 3.6 x 3.7 cm and was located in left adrenal gland. The review of medical records revealed that the mass was present in 2009 and measured 1.3 x 1.7 cm. CT in 2020 showed 7 cm mass arising from left adrenal gland. Evaluate if adrenal tumor is secreting hormones. Proceed with work-up. Then reevaluate. Based on the size, surgery is indicated. Depending on hormonal work-up, patient will be evaluated if he needs more specific preparation for surgery based on hormone results. - ACTH; Future  - Renin; Future  - Cortisol AM, Total; Future  - Aldosterone; Future  - Metanephrines Plasma Free; Future  - Cortisol, Urine, Free; Future  - Creatinine Clearance, Urine, 24 HR; Future  - Metanephrines, urine, 24 hour; Future  - Comprehensive Metabolic Panel; Future  - Catecholamines Free Urine; Future  - DHEA-Sulfate; Future    2. Essential hypertension  Continue lisinopril and metoprolol  - ACTH; Future  - Renin; Future  - Cortisol AM, Total; Future  - Aldosterone; Future  - Metanephrines Plasma Free; Future  - Cortisol, Urine, Free; Future  - Creatinine Clearance, Urine, 24 HR; Future  - Metanephrines, urine, 24 hour; Future  - Comprehensive Metabolic Panel; Future  - Catecholamines Free Urine; Future  - DHEA-Sulfate; Future    3. Class 3 severe obesity with serious comorbidity and body mass index (BMI) of 40.0 to 44.9 in adult, unspecified obesity type (HCC)  Healthy eating, activity as tolerated    4. Kidney stones  Total 4 episodes  Present kidney stones on CT in September 2020  - Creatinine Clearance, Urine, 24 HR; Future  - Comprehensive Metabolic Panel;  Future  - Calcium, 24 HR Urine; Future  - Sodium, urine, 24 hour; Future      Reviewed and/or ordered clinical lab results Yes  Reviewed and/or ordered radiology tests Yes   Reviewed and/or ordered other diagnostic tests No  Discussed test results with performing physician No  Independently reviewed image, tracing, or specimen No  Made a decision to obtain old records No  Reviewed and summarized old records Yes   Potassium 4.0  Natrium 137   TSH 0.56  9/2020  Diffusely low attenuation of the liver with respect to spleen is    compatible with fatty infiltration.  Unenhanced spleen, pancreas, right    adrenal gland, and gallbladder within normal limits.  7 cm mass arising from    the left adrenal gland containing multiple foci of macroscopic fat and    calcification most compatible with a myelolipoma.  6 cm stone in the proximal    right ureter with mild right hydronephrosis.  Additional 4 mm smaller    nonobstructing stones right kidney.  No left kidney stones   9/2013  There is a 3.6 x 3.7 cm nodule of the left adrenal gland which has    increased in size from 2009 study where it measured 1.3 x 1.7 cm        Obtained history from other than patient No    Arielamerica Rodriguezs was counseled regarding symptoms of adrenal tumor diagnosis, course and complications of disease if inadequately treated, side effects of medications, diagnosis, treatment options, and prognosis, risks, benefits, complications, and alternatives of treatment, labs, imaging and other studies and treatment targets and goals, hormonal work-up, indications for surgery, importance of hormonal evaluation for preparation for surgery, risk of cancer, endocrine hypertension. He understands instructions and counseling. Return in about 2 weeks (around 11/4/2020) for adrenal tumor.     Electronically signed by Rhys Fisher MD on 10/21/2020 at 11:04 PM

## 2020-10-26 ENCOUNTER — HOSPITAL ENCOUNTER (OUTPATIENT)
Age: 45
Discharge: HOME | End: 2020-10-26
Payer: COMMERCIAL

## 2020-10-26 LAB
24HR URINE VOLUME (ML): 2390 ML
A/G RATIO: 1.7 (ref 1.1–2.2)
ALBUMIN SERPL-MCNC: 4.5 G/DL (ref 3.4–5)
ALP BLD-CCNC: 95 U/L (ref 40–129)
ALT SERPL-CCNC: 43 U/L (ref 10–40)
ANION GAP SERPL CALCULATED.3IONS-SCNC: 15 MMOL/L (ref 3–16)
AST SERPL-CCNC: 20 U/L (ref 15–37)
BILIRUB SERPL-MCNC: 0.3 MG/DL (ref 0–1)
BUN BLDV-MCNC: 14 MG/DL (ref 7–20)
CALCIUM 24 HOUR URINE: 507 MG/24 HR (ref 42–353)
CALCIUM SERPL-MCNC: 9.7 MG/DL (ref 8.3–10.6)
CHLORIDE BLD-SCNC: 104 MMOL/L (ref 99–110)
CO2: 23 MMOL/L (ref 21–32)
CORTISOL - AM: 11 UG/DL (ref 4.3–22.4)
CREAT SERPL-MCNC: 0.8 MG/DL (ref 0.9–1.3)
CREAT SERPL-MCNC: 0.9 MG/DL (ref 0.8–1.3)
CREATININE 24 HOUR URINE: 2.6 G/24HR (ref 0.6–2.5)
CREATININE CLEARANCE: 141 ML/MIN (ref 100–140)
GFR AFRICAN AMERICAN: >60
GFR NON-AFRICAN AMERICAN: >60
GLOBULIN: 2.7 G/DL
GLUCOSE BLD-MCNC: 106 MG/DL (ref 70–99)
Lab: 24 HR
POTASSIUM SERPL-SCNC: 4.3 MMOL/L (ref 3.5–5.1)
SODIUM 24 HOUR URINE: 249 MMOL/24 HR (ref 40–220)
SODIUM BLD-SCNC: 142 MMOL/L (ref 136–145)
TOTAL PROTEIN: 7.2 G/DL (ref 6.4–8.2)

## 2020-10-26 PROCEDURE — 82340 ASSAY OF CALCIUM IN URINE: CPT

## 2020-10-26 PROCEDURE — 82533 TOTAL CORTISOL: CPT

## 2020-10-26 PROCEDURE — 83835 ASSAY OF METANEPHRINES: CPT

## 2020-10-26 PROCEDURE — 36415 COLL VENOUS BLD VENIPUNCTURE: CPT

## 2020-10-26 PROCEDURE — 84300 ASSAY OF URINE SODIUM: CPT

## 2020-10-26 PROCEDURE — 82530 CORTISOL FREE: CPT

## 2020-10-26 PROCEDURE — 82384 ASSAY THREE CATECHOLAMINES: CPT

## 2020-10-26 PROCEDURE — 80053 COMPREHEN METABOLIC PANEL: CPT

## 2020-10-26 PROCEDURE — 82627 DEHYDROEPIANDROSTERONE: CPT

## 2020-10-26 PROCEDURE — 82088 ASSAY OF ALDOSTERONE: CPT

## 2020-10-26 PROCEDURE — 82575 CREATININE CLEARANCE TEST: CPT

## 2020-10-26 PROCEDURE — 82024 ASSAY OF ACTH: CPT

## 2020-10-26 PROCEDURE — 84244 ASSAY OF RENIN: CPT

## 2020-10-28 LAB
ADRENOCORTICOTROPIC HORMONE: 24 PG/ML (ref 7–69)
ALDOSTERONE: 12.9 NG/DL
DHEAS (DHEA SULFATE): 217 UG/DL (ref 95–530)

## 2020-10-29 LAB
METANEPH/PLASMA INTERP: NORMAL
METANEPHRINE FREE PLASMA: 0.11 NMOL/L (ref 0–0.49)
NORMETANEPHRINE FREE PLASMA: 0.28 NMOL/L (ref 0–0.89)

## 2020-10-30 LAB
CORTISOL (UR), FREE: 39 UG/D
CORTISOL URINE, FREE (/G CRT): 17.53 UG/G CRT
CORTISOL,F,UG/L,U: 16.3 UG/L
INTERPRETATION: NORMAL

## 2020-10-31 LAB
CATE U INT: NORMAL
CREATININE 24 HOUR URINE: 2223 MG/D (ref 1000–2500)
CREATININE URINE: 93 MG/DL
DOPAMINE (G CRT): 147 UG/G CRT (ref 0–250)
DOPAMINE 24 HOUR URINE: 327 UG/D (ref 71–485)
DOPAMINE, (UG/L): 137 UG/L
EPINEPHRINE (G CRT): 1 UG/G CRT (ref 0–20)
EPINEPHRINE 24 HOUR URINE: 2 UG/D (ref 1–14)
EPINEPHRINE, (UG/L): 1 UG/L
HOURS COLLECTED: 24
NOREPINEPH (G CRT): 19 UG/G CRT (ref 0–45)
NOREPINEPHRINE 24 HOUR URINE: 43 UG/D (ref 14–120)
NOREPINEPHRINE, (UG/L): 18 UG/L
URINE TOTAL VOLUME: 2390

## 2020-11-01 LAB — RENIN ACTIVITY: 2.6 NG/ML/HR

## 2020-11-11 ENCOUNTER — TELEPHONE (OUTPATIENT)
Dept: ENDOCRINOLOGY | Age: 45
End: 2020-11-11

## 2020-11-11 ENCOUNTER — CLINICAL DOCUMENTATION (OUTPATIENT)
Dept: ENDOCRINOLOGY | Age: 45
End: 2020-11-11

## 2020-11-11 ENCOUNTER — OFFICE VISIT (OUTPATIENT)
Dept: ENDOCRINOLOGY | Age: 45
End: 2020-11-11
Payer: COMMERCIAL

## 2020-11-11 VITALS
TEMPERATURE: 97.1 F | SYSTOLIC BLOOD PRESSURE: 143 MMHG | HEART RATE: 100 BPM | BODY MASS INDEX: 40.74 KG/M2 | HEIGHT: 71 IN | OXYGEN SATURATION: 98 % | WEIGHT: 291 LBS | RESPIRATION RATE: 14 BRPM | DIASTOLIC BLOOD PRESSURE: 88 MMHG

## 2020-11-11 PROCEDURE — 99214 OFFICE O/P EST MOD 30 MIN: CPT | Performed by: INTERNAL MEDICINE

## 2020-11-11 NOTE — TELEPHONE ENCOUNTER
Pt called to let us know he called his insurance company and was given the name Dr. Liza Thompson with [facility]. Insurance requested referral to be placed.

## 2020-11-11 NOTE — PROGRESS NOTES
SUBJECTIVE:  Ross Armas is a 45 y.o. male who is being evaluated for adrenal tumor     1. Adrenal tumor  This started in 2013. Patient was diagnosed with adrenal tumor. The problem has been gradually worsening. Patient started medication in N/A. Currently patient is on: N/A. Misses N/A doses a month. Current complaints: fatigue, weight gain, depression  4 years ago lost 70 lbs, then put back 80 lbs  Eats healthier  Kidney stones for 15 years, 4 episodes  Used to have spells, but not for 8-9 years. 2. Essential hypertension  HTN   For a while  Occasional headaches   7-8 years    History of obstructive symptoms: difficulty swallowing No, changes in voice/hoarseness No.  History of radiation to patient's neck: No  Resent iodine exposure: No  Family history includes no thyroid abnormalities. Family history of thyroid cancer: No    3. Class 3 severe obesity with serious comorbidity and body mass index (BMI) of 40.0 to 44.9 in adult, unspecified obesity type ([address]  Current complaints: fatigue, weight gain, depression  4 years ago lost 70 lbs, then put back 80 lbs  Eats healthier    4.  Kidney stones  Kidney stones for 15 years, 4 episodes  Severe pain during episodes    Past Medical History:   Diagnosis Date    Difficult airway for intubation 09/21/2020    good view glidescope, difficult to pass tube    Hypertension     Kidney stones     MRSA (methicillin resistant Staphylococcus aureus) 12/10/12    SAAD (obstructive sleep apnea)      Patient Active Problem List    Diagnosis Date Noted    Adrenal tumor 10/21/2020    Class 3 severe obesity with body mass index (BMI) of 40.0 to 44.9 in adult [facility]) 10/21/2020    Hydronephrosis with renal calculous obstruction 09/21/2020    Kidney stone     Intractable pain     Obesity, Class III, BMI 40-49.9 (morbid obesity) ([address] 01/27/2017    Thrombocytosis ([facility] 08/17/2016    Severe obstructive sleep apnea 07/20/2016    Hyperglycemia 11/04/2014    Family history of diabetes mellitus 2014    HTN (hypertension) 2014    Attention deficit hyperactivity disorder (ADHD), predominantly inattentive type 2013     Past Surgical History:   Procedure Laterality Date    CYSTOSCOPY Right 2020    CYSTOSCOPY WITH STONE MANIPULIATION,RIGHT STENT PLACEMENT performed by Lester Cota MD at [address] [facility]    Wide Excision of Nonhealing Cysts Right Axilla and Bilateral Upper Inner Thighs    OTHER SURGICAL HISTORY  2013    cystoscopy, right retrograde, ureteroscopy, Holmium Laser lithotripsy, stone extraction    OTHER SURGICAL HISTORY  2020    cystoscopy with stone manipulation right stemnt placement     Family History   Problem Relation Age of Onset    Diabetes Mother     Asthma Father     COPD Father      Social History     Socioeconomic History    Marital status:      Spouse name: None    Number of children: None    Years of education: None    Highest education level: None   Occupational History    None   Social Needs    Financial resource strain: None    Food insecurity     Worry: None     Inability: None    Transportation needs     Medical: None     Non-medical: None   Tobacco Use    Smoking status: Former Smoker     Packs/day: 1.00     Years: 10.00     Pack years: 10.00     Types: Cigarettes     Last attempt to quit: 2013     Years since quittin.8    Smokeless tobacco: Current User     Types: Chew   Substance and Sexual Activity    Alcohol use: Yes     Comment: till 1 mo.  ago was drinking every night, now rare    Drug use: No     Comment: 6-7 yrs ago    Sexual activity: Yes     Partners: Female   Lifestyle    Physical activity     Days per week: None     Minutes per session: None    Stress: None   Relationships    Social connections     Talks on phone: None     Gets together: None     Attends Restoration service: None     Active member of club or organization: None     Attends meetings of clubs or organizations: None     Relationship status: None    Intimate partner violence     Fear of current or ex partner: None     Emotionally abused: None     Physically abused: None     Forced sexual activity: None   Other Topics Concern    None   Social History Narrative    None     Current Outpatient Medications   Medication Sig Dispense Refill    FreeStyle Lancets MISC       famotidine (PEPCID) 20 MG tablet Take 20 mg by mouth 2 times daily      TRULICITY 4.24 CA/5.4BV SOPN       metFORMIN (GLUCOPHAGE) 1000 MG tablet       lisinopril (PRINIVIL;ZESTRIL) 20 MG tablet Take 1 tablet by mouth daily (Patient taking differently: Take 10 mg by mouth daily ) 90 tablet 0    metoprolol (LOPRESSOR) 25 MG tablet Take 1 tablet by mouth 2 times daily. (Patient taking differently: Take 12.5 mg by mouth 2 times daily ) 180 tablet 1    HYDROcodone-acetaminophen (NORCO) 5-325 MG per tablet       ondansetron (ZOFRAN ODT) 4 MG disintegrating tablet Take 1 tablet by mouth every 8 hours as needed for Nausea (Patient not taking: Reported on 10/21/2020) 20 tablet 0    tamsulosin (FLOMAX) 0.4 MG capsule Take 1 capsule by mouth daily for 5 doses (Patient not taking: Reported on 10/21/2020) 5 capsule 0    ibuprofen (IBU) 800 MG tablet Take 1 tablet by mouth every 8 hours as needed for Pain (Patient not taking: Reported on 11/11/2020) 20 tablet 0    naproxen (NAPROSYN) 375 MG tablet Take 1 tablet by mouth 2 times daily for 10 days 20 tablet 0     No current facility-administered medications for this visit.       Allergies   Allergen Reactions    Amoxicillin Nausea Only    Erythromycin     Pcn [Penicillins] Hives     Family Status   Relation Name Status    Mother  Alive    Father  Alive       Review of Systems:  Constitutional: has fatigue, no fever, has recent weight gain, no recent weight loss, has changes in appetite  Eyes: no eye pain, no change in vision, no eye redness, no eye irritation, no double vision  Ears, nose, throat: no nasal congestion, no sore throat, no earache, no decrease in hearing, no hoarseness, no dry mouth, no sinus problems, no difficulty swallowing, no neck lumps, no dental problems, no mouth sores, no ringing in ears  Pulmonary: no shortness of breath, no wheezing, no dyspnea on exertion, no cough  Cardiovascular: no chest pain, no lower extremity edema, no orthopnea, no intermittent leg claudication, no palpitations  Gastrointestinal: no abdominal pain, no nausea, no vomiting, has diarrhea, has constipation, no dysphagia, has heartburn, no bloating  Genitourinary: no dysuria, no urinary incontinence, no urinary hesitancy, no urinary frequency, no feelings of urinary urgency, no nocturia  Musculoskeletal: no joint swelling, no joint stiffness, no joint pain, no muscle cramps, no muscle pain, no bone pain  Integument/Breast: no hair loss, no skin rashes, no skin lesions, no itching, no dry skin  Neurological: no numbness, no tingling, no weakness, no confusion, no headaches, no dizziness, no fainting, no tremors, no decrease in memory, no balance problems  Psychiatric: has anxiety, has depression, has insomnia  Hematologic/Lymphatic: no tendency for easy bleeding, no swollen lymph nodes, no tendency for easy bruising  Immunology: no seasonal allergies, no frequent infections, no frequent illnesses  Endocrine: no temperature intolerance    BP (!) 143/88   Pulse 100   Temp 97.1 °F (36.2 °C)   Resp 14   Ht 5' 11\" (1.803 m)   Wt 291 lb (132 kg)   SpO2 98%   BMI 40.59 kg/m²    Wt Readings from Last 3 Encounters:   11/11/20 291 lb (132 kg)   10/21/20 290 lb (131.5 kg)   09/21/20 288 lb (130.6 kg)     Body mass index is 40.59 kg/m².     OBJECTIVE:  Constitutional: no acute distress, well appearing and well nourished  Psychiatric: oriented to person, place and time, judgement and insight and normal, recent and remote memory and intact and mood and affect are normal  Skin: skin and results found for: CHOL  No results found for: TRIG  No results found for: HDL  No results found for: LDLCHOLESTEROL, LDLCALC  No results found for: LABVLDL, VLDL  No results found for: [facility]  Lab Results   Component Value Date    LABMICR YES 09/21/2020     No results found for: VITD25     ASSESSMENT/PLAN:  1. Adrenal tumor  Patient is aware that adrenal mass was diagnosed in 2013. At that time it measured 3.6 x 3.7 cm and was located in left adrenal gland. The review of medical records revealed that the mass was present in 2009 and measured 1.3 x 1.7 cm. CT in 2020 showed 7 cm mass arising from left adrenal gland. Based on the size, surgery is indicated. Referred patient to surgery  Plasma normetanephrines, 24-hour urine catecholamines, cortisol normal  Aldosterone and plasma renin activity normal    2. Essential hypertension  Continue lisinopril and metoprolol    3. Class 3 severe obesity with serious comorbidity and body mass index (BMI) of 40.0 to 44.9 in adult, unspecified obesity type (HCC)  Healthy eating, activity as tolerated    4.  Kidney stones  Total 4 episodes  Present kidney stones on CT in September 2020  Serum calcium 9.7  24-hour urine calcium 507  24-hour urine sodium 249  Not on calcium and vitamin D supplement  Advised to decrease sodium intake in diet, reevaluate next appointment      Reviewed and/or ordered clinical lab results Yes  Reviewed and/or ordered radiology tests Yes   Reviewed and/or ordered other diagnostic tests No  Discussed test results with performing physician No  Independently reviewed image, tracing, or specimen No  Made a decision to obtain old records No  Reviewed and summarized old records Yes   Potassium 4.0  Natrium 137   TSH 0.56  9/2020  Diffusely low attenuation of the liver with respect to spleen is    compatible with fatty infiltration.  Unenhanced spleen, pancreas, right    adrenal gland, and gallbladder within normal limits.  7 cm mass arising from the left adrenal gland containing multiple foci of macroscopic fat and    calcification most compatible with a myelolipoma.  6 cm stone in the proximal    right ureter with mild right hydronephrosis.  Additional 4 mm smaller    nonobstructing stones right kidney.  No left kidney stones   9/2013  There is a 3.6 x 3.7 cm nodule of the left adrenal gland which has    increased in size from 2009 study where it measured 1.3 x 1.7 cm        Obtained history from other than patient Laurence Giles was counseled regarding symptoms of adrenal tumor diagnosis, course and complications of disease if inadequately treated, side effects of medications, diagnosis, treatment options, and prognosis, risks, benefits, complications, and alternatives of treatment, labs, imaging and other studies and treatment targets and goals, hormonal work-up results, indications for surgery. He understands instructions and counseling. Return in about 3 months (around 2/11/2021) for adrenal tumor.     Electronically signed by Justin Bejarano MD on 11/11/2020 at 12:37 PM

## 2020-11-30 ENCOUNTER — TELEPHONE (OUTPATIENT)
Dept: ENDOCRINOLOGY | Age: 45
End: 2020-11-30

## 2020-11-30 NOTE — TELEPHONE ENCOUNTER
Dr. Jose Quispe [phone] office called to have records faxed to them for the patients upcoming appt with their office tomorrow. He was referred to them by Dr Lazarus Plenty for an adrenal tumor.  The fax number is [phone]

## 2020-12-01 ENCOUNTER — TELEPHONE (OUTPATIENT)
Dept: ENDOCRINOLOGY | Age: 45
End: 2020-12-01

## 2020-12-01 NOTE — TELEPHONE ENCOUNTER
Chely Gould from dr. Dakotah Ortiz office called to talk to dr. Denise Lux about the patient who was seen in their office this morning

## 2021-02-10 PROBLEM — Z86.018 HISTORY OF ADRENAL ADENOMA: Status: ACTIVE | Noted: 2021-02-10

## 2021-02-10 PROBLEM — E89.6 H/O PARTIAL ADRENALECTOMY (HCC): Status: ACTIVE | Noted: 2021-02-10

## 2021-02-19 ENCOUNTER — VIRTUAL VISIT (OUTPATIENT)
Dept: ENDOCRINOLOGY | Age: 46
End: 2021-02-19
Payer: COMMERCIAL

## 2021-02-19 DIAGNOSIS — E66.01 CLASS 3 SEVERE OBESITY WITH SERIOUS COMORBIDITY AND BODY MASS INDEX (BMI) OF 40.0 TO 44.9 IN ADULT, UNSPECIFIED OBESITY TYPE (HCC): ICD-10-CM

## 2021-02-19 DIAGNOSIS — E89.6 H/O PARTIAL ADRENALECTOMY (HCC): ICD-10-CM

## 2021-02-19 DIAGNOSIS — I10 ESSENTIAL HYPERTENSION: ICD-10-CM

## 2021-02-19 DIAGNOSIS — N20.0 KIDNEY STONE: ICD-10-CM

## 2021-02-19 DIAGNOSIS — D49.7 ADRENAL TUMOR: Primary | ICD-10-CM

## 2021-02-19 DIAGNOSIS — R82.994 HYPERCALCIURIA: ICD-10-CM

## 2021-02-19 PROCEDURE — 99214 OFFICE O/P EST MOD 30 MIN: CPT | Performed by: INTERNAL MEDICINE

## 2021-02-19 NOTE — PROGRESS NOTES
Types: Chew   Substance and Sexual Activity    Alcohol use: Yes     Comment: till 1 mo. ago was drinking every night, now rare    Drug use: No     Comment: 6-7 yrs ago    Sexual activity: Yes     Partners: Female   Lifestyle    Physical activity     Days per week: None     Minutes per session: None    Stress: None   Relationships    Social connections     Talks on phone: None     Gets together: None     Attends Evangelical service: None     Active member of club or organization: None     Attends meetings of clubs or organizations: None     Relationship status: None    Intimate partner violence     Fear of current or ex partner: None     Emotionally abused: None     Physically abused: None     Forced sexual activity: None   Other Topics Concern    None   Social History Narrative    None     Current Outpatient Medications   Medication Sig Dispense Refill    FreeStyle Lancets MISC       famotidine (PEPCID) 20 MG tablet Take 20 mg by mouth 2 times daily      metFORMIN (GLUCOPHAGE) 1000 MG tablet       lisinopril (PRINIVIL;ZESTRIL) 20 MG tablet Take 1 tablet by mouth daily (Patient taking differently: Take 10 mg by mouth daily ) 90 tablet 0    metoprolol (LOPRESSOR) 25 MG tablet Take 1 tablet by mouth 2 times daily.  (Patient taking differently: Take 12.5 mg by mouth 2 times daily ) 180 tablet 1    HYDROcodone-acetaminophen (NORCO) 5-325 MG per tablet       ondansetron (ZOFRAN ODT) 4 MG disintegrating tablet Take 1 tablet by mouth every 8 hours as needed for Nausea (Patient not taking: Reported on 10/21/2020) 20 tablet 0    tamsulosin (FLOMAX) 0.4 MG capsule Take 1 capsule by mouth daily for 5 doses (Patient not taking: Reported on 10/21/2020) 5 capsule 0    ibuprofen (IBU) 800 MG tablet Take 1 tablet by mouth every 8 hours as needed for Pain (Patient not taking: Reported on 11/11/2020) 20 tablet 0    TRULICITY 5.87 NM/2.2UR SOPN  naproxen (NAPROSYN) 375 MG tablet Take 1 tablet by mouth 2 times daily for 10 days 20 tablet 0     No current facility-administered medications for this visit.       Allergies   Allergen Reactions    Amoxicillin Nausea Only    Erythromycin     Pcn [Penicillins] Hives     Family Status   Relation Name Status    Mother  Alive    Father  Alive       Review of Systems:  Constitutional: has fatigue, no fever, has recent weight gain, no recent weight loss, has changes in appetite  Eyes: no eye pain, no change in vision, no eye redness, no eye irritation, no double vision  Ears, nose, throat: no nasal congestion, no sore throat, no earache, no decrease in hearing, no hoarseness, no dry mouth, no sinus problems, no difficulty swallowing, no neck lumps, no dental problems, no mouth sores, no ringing in ears  Pulmonary: no shortness of breath, no wheezing, no dyspnea on exertion, no cough  Cardiovascular: no chest pain, no lower extremity edema, no orthopnea, no intermittent leg claudication, no palpitations  Gastrointestinal: no abdominal pain, no nausea, no vomiting, has diarrhea, has constipation, no dysphagia, has heartburn, no bloating  Genitourinary: no dysuria, no urinary incontinence, no urinary hesitancy, no urinary frequency, no feelings of urinary urgency, no nocturia  Musculoskeletal: no joint swelling, no joint stiffness, no joint pain, no muscle cramps, no muscle pain, no bone pain  Integument/Breast: no hair loss, no skin rashes, no skin lesions, no itching, no dry skin  Neurological: no numbness, no tingling, no weakness, no confusion, no headaches, no dizziness, no fainting, no tremors, no decrease in memory, no balance problems  Psychiatric: has anxiety, has depression, has insomnia  Hematologic/Lymphatic: no tendency for easy bleeding, no swollen lymph nodes, no tendency for easy bruising  Immunology: no seasonal allergies, no frequent infections, no frequent illnesses Endocrine: no temperature intolerance    There were no vitals taken for this visit. Wt Readings from Last 3 Encounters:   11/11/20 291 lb (132 kg)   10/21/20 290 lb (131.5 kg)   09/21/20 288 lb (130.6 kg)     There is no height or weight on file to calculate BMI.     OBJECTIVE:  Constitutional: no apparent distress, well developed and well nourished  Mental status: alert and awake, oriented to person, place and time, able to follow commands  Psychiatric: judgement and insight and normal, recent and remote memory are intact, mood and affect are normal  Skin: skin inspection appears normal, no significant exanthematous lesions or discoloration noted on facial skin  Head and Face: head and face inspection revealed no abnormalities, normocephalic, atraumatic  Eyes: no lid or conjunctival swelling, erythema or discharge, sclera appears normal  Ears/Nose: external inspection of ears and nose revealed no abnormalities, hearing is grossly normal  Oropharynx/Mouth/Face: lips are normal with no lesions, the voice quality was normal  Neck: neck is symmetric, no visualized mass  Pulmonary/chest: respiratory effort normal, no generalized signs of difficulty breathing or signs of respiratory distress  Musculoskeletal: normal station, normal range of motion of neck  Neurological: no facial asymmetry, normal general cortical function        Lab Review:    Lab Results   Component Value Date    WBC 14.2 09/21/2020    HGB 14.6 09/21/2020    HCT 43.4 09/21/2020    MCV 87.2 09/21/2020     09/21/2020     Lab Results   Component Value Date     10/26/2020    K 4.3 10/26/2020    K 4.2 09/21/2020     10/26/2020    CO2 23 10/26/2020    BUN 14 10/26/2020    CREATININE 0.8 10/26/2020    GLUCOSE 106 10/26/2020    CALCIUM 9.7 10/26/2020    PROT 7.2 10/26/2020    PROT 7.6 01/23/2013    LABALBU 4.5 10/26/2020    BILITOT 0.3 10/26/2020    ALKPHOS 95 10/26/2020    AST 20 10/26/2020    ALT 43 10/26/2020    LABGLOM >60 10/26/2020 GFRAA >60 10/26/2020    GFRAA >60 01/23/2013    AGRATIO 1.7 10/26/2020    GLOB 2.7 10/26/2020     Lab Results   Component Value Date    TSH 0.56 01/23/2013     Lab Results   Component Value Date    LABA1C 6.6 09/21/2020     Lab Results   Component Value Date    .7 09/21/2020     No results found for: CHOL  No results found for: TRIG  No results found for: HDL  No results found for: LDLCHOLESTEROL, LDLCALC  No results found for: LABVLDL, VLDL  No results found for: [facility]  Lab Results   Component Value Date    LABMICR YES 09/21/2020     No results found for: VITD25     ASSESSMENT/PLAN:  1. Adrenal tumor/history of adrenalectomy  Patient is aware that adrenal mass was diagnosed in 2013. At that time it measured 3.6 x 3.7 cm and was located in left adrenal gland. The review of medical records revealed that the mass was present in 2009 and measured 1.3 x 1.7 cm. CT in 2020 showed 7 cm mass arising from left adrenal gland. Plasma normetanephrines, 24-hour urine catecholamines, cortisol normal  Aldosterone and plasma renin activity normal      TISSUE - Abdomen 1/8/2021 1:20 PM   Result Narrative   CASE: [id number]  PATIENT: Meryle Shaw HACKER    Clinical History:  Pre-Operative Diagnosis: Adrenal mass  Post-Operative Diagnosis:     Same  Specimen(s) Submitted:   A. 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 Comment:  The tumor appears well circumscribed and shows extensive infarction. The  viable cells present do not show increased mitotic activity and vascular  invasion is not identified. The lesional cells are positive for Melan A with focal expression of  Calretinin, synaptophysin and inhibin. RCC, PAX8, CD10 and Chromogranin are  negative.  The overall findings are that of an adrenal cortical neoplasm consistent with adrenal cortical adenoma. It appears completely excised as  the margins are not transected by tumor. Clinical correlation with follow  up is recommended. Frozen Section Diagnosis:  ORC: Well-circumscribed tumor identified in the adrenal medulla, appears  excised with thin rim of cortex surrounding the tumor.  (Dr. George Delarosa)    Microscopic Description:  Microscopic examination was performed.  All described special stains have  appropriate controls and have been ordered as medically necessary 1)  following (prior) review of the corresponding H&E slide(s) and/or 2) in  special case types, such as medical liver core biopsies, renal biopsies,  sentinel lymph nodes, and others, as required for diagnosis. 2. Essential hypertension  Continue lisinopril and metoprolol    3. Class 3 severe obesity with serious comorbidity and body mass index (BMI) of 40.0 to 44.9 in adult, unspecified obesity type (HCC)  Healthy eating, activity as tolerated    4. Kidney stones  Total 4 episodes  Present kidney stones on CT in September 2020  Serum calcium 9.7  24-hour urine calcium 507  24-hour urine sodium 249  Not on calcium and vitamin D supplement  Advised to decrease sodium intake in diet, reevaluate again with 24-hour urine collection for calcium and sodium. If calcium remains elevated, consider hydrochlorothiazide. 5.  Hypercalciuria  New problem  24-hour urine calcium 507  24-hour urine sodium 249  Not on calcium and vitamin D supplement  Advised to decrease sodium intake in diet, reevaluate again with 24-hour urine collection for calcium and sodium. If calcium remains elevated, consider hydrochlorothiazide.     Reviewed and/or ordered clinical lab results Yes  Reviewed and/or ordered radiology tests Yes   Reviewed and/or ordered other diagnostic tests No  Discussed test results with performing physician No  Independently reviewed image, tracing, or specimen No Made a decision to obtain old records No  Reviewed and summarized old records Yes   Potassium 4.0  Natrium 137   TSH 0.56  9/2020  Diffusely low attenuation of the liver with respect to spleen is    compatible with fatty infiltration.  Unenhanced spleen, pancreas, right    adrenal gland, and gallbladder within normal limits.  7 cm mass arising from    the left adrenal gland containing multiple foci of macroscopic fat and    calcification most compatible with a myelolipoma.  6 cm stone in the proximal    right ureter with mild right hydronephrosis.  Additional 4 mm smaller    nonobstructing stones right kidney.  No left kidney stones   9/2013  There is a 3.6 x 3.7 cm nodule of the left adrenal gland which has    increased in size from 2009 study where it measured 1.3 x 1.7 cm        Obtained history from other than patient No    Dorota Cody was counseled regarding symptoms of adrenal tumor diagnosis, course and complications of disease if inadequately treated, side effects of medications, diagnosis, treatment options, and prognosis, risks, benefits, complications, and alternatives of treatment, labs, imaging and other studies and treatment targets and goals, hypercalciuria, relationship with kidney stones, treatment options, follow-up evaluation  He understands instructions and counseling. Dorota Cody is a 39 y.o. male being evaluated by a Virtual Visit (video visit) encounter, including two-way audio and video communication, in lieu of an in-person visit due to coronavirus emergency, to address concerns as mentioned in history and assessment and plan. Patient identification was verified at the start of the visit. I conducted an interview, performed a limited exam by video and educated the patient on my assessment and plan. Due to this being a TeleHealth encounter (During VNFCE-55 public health emergency), evaluation of the following organ systems was limited: Vitals/Constitutional/EENT/Resp/CV/GI//MS/Neuro/Skin/Heme-Lymph-Imm. Pursuant to the emergency declaration under the [facility], [address] [place] [address] and the [employer] Act, this Virtual Visit was conducted with patient's (and/or legal guardian's) consent, to reduce the patient's risk of exposure to COVID-19 and provide necessary medical care. The patient (and/or legal guardian) has also been advised to contact this office for worsening conditions or problems, and seek emergency medical treatment and/or call 911 if deemed necessary. Total time spent on this encounter via Telehealth (synchronous, real-time audio/visual connection): 30 min    See assessment, plan and counseling note for counseling and care coordination details. Services were provided through a video synchronous discussion virtually to substitute for in-person clinic visit. Persons participating in the telehealth service: provider - Vale Bailey MD and patient Bailey Barbara. Provider was located at her office. Patient was located at home. --Vale Bailey MD on 2/20/2021 at 1:16 AM    An electronic signature was used to authenticate this note. No follow-ups on file. Patient wants to continue follow-up with his family doctor. Revisit as needed.     Electronically signed by Vale Bailey MD on 2/19/2021 at 11:19 AM

## 2021-02-20 PROBLEM — R82.994 HYPERCALCIURIA: Status: ACTIVE | Noted: 2021-02-20

## 2021-07-07 ENCOUNTER — OFFICE VISIT (OUTPATIENT)
Dept: CARDIOLOGY CLINIC | Age: 46
End: 2021-07-07
Payer: COMMERCIAL

## 2021-07-07 VITALS
TEMPERATURE: 97.1 F | OXYGEN SATURATION: 98 % | SYSTOLIC BLOOD PRESSURE: 112 MMHG | DIASTOLIC BLOOD PRESSURE: 80 MMHG | WEIGHT: 278 LBS | HEART RATE: 80 BPM | HEIGHT: 70 IN | BODY MASS INDEX: 39.8 KG/M2

## 2021-07-07 DIAGNOSIS — Z82.49 FAMILY HISTORY OF CORONARY ARTERY DISEASE: ICD-10-CM

## 2021-07-07 DIAGNOSIS — I10 ESSENTIAL HYPERTENSION: ICD-10-CM

## 2021-07-07 DIAGNOSIS — R94.31 ABNORMAL EKG: Primary | ICD-10-CM

## 2021-07-07 PROCEDURE — 93000 ELECTROCARDIOGRAM COMPLETE: CPT | Performed by: INTERNAL MEDICINE

## 2021-07-07 PROCEDURE — 99204 OFFICE O/P NEW MOD 45 MIN: CPT | Performed by: INTERNAL MEDICINE

## 2021-07-07 RX ORDER — CETIRIZINE HYDROCHLORIDE 10 MG/1
10 TABLET ORAL DAILY
COMMUNITY
End: 2021-07-07

## 2021-07-07 NOTE — PROGRESS NOTES
drinks 1 drink daily. reports that he quit smoking about 8 years ago. His smoking use included cigarettes. He has a 10.00 pack-year smoking history. His smokeless tobacco use includes chew. He reports current alcohol use. He reports that he does not use drugs. He did use recreational drugs in the remote past    Family History:  Mom had myocardial infarction at age 64; dad has no heart history documented. Notes his paternal grandparents both suffered from coronary disease and myocardial infarction. Home Medications:  Were reviewed and are listed in nursing record and/or below  Prior to Admission medications    Medication Sig Start Date End Date Taking? Authorizing Provider   cetirizine (ZYRTEC) 10 MG tablet Take 10 mg by mouth daily   Yes Historical Provider, MD   famotidine (PEPCID) 20 MG tablet Take 20 mg by mouth 2 times daily   Yes Historical Provider, MD   metFORMIN (GLUCOPHAGE) 1000 MG tablet  8/11/20  Yes Historical Provider, MD   lisinopril (PRINIVIL;ZESTRIL) 20 MG tablet Take 1 tablet by mouth daily  Patient taking differently: Take 10 mg by mouth daily  11/18/15  Yes Jania Shankar MD   metoprolol (LOPRESSOR) 25 MG tablet Take 1 tablet by mouth 2 times daily.   Patient taking differently: Take 12.5 mg by mouth 2 times daily  11/20/14  Yes Juancho Purvis MD   [place] [id number] [facility]  9/20/20   Historical Provider, MD   HYDROcodone-acetaminophen [facility]) 5-325 MG per tablet  10/16/20   Historical Provider, MD   ondansetron (ZOFRAN ODT) 4 MG disintegrating tablet Take 1 tablet by mouth every 8 hours as needed for Nausea  Patient not taking: Reported on 10/21/2020 9/18/20   SABA Byrd   tamsulosin [facility]) 0.4 MG capsule Take 1 capsule by mouth daily for 5 doses  Patient not taking: Reported on 10/21/2020 9/18/20 9/23/20  SABA Byrd   ibuprofen (IBU) 800 MG tablet Take 1 tablet by mouth every 8 hours as needed for Pain  Patient not taking: Reported on 11/11/2020 9/18/20   Haylee Urena SABA [place] Cid   TRULICITY 4.11 BT/4.8SO SOPN  9/1/20   Historical Provider, MD   naproxen (NAPROSYN) 375 MG tablet Take 1 tablet by mouth 2 times daily for 10 days  Patient not taking: Reported on 7/7/2021 7/26/17 8/5/17  Damian GarveyRAJINDER frank - CNP        CURRENT Medications:  No current facility-administered medications for this visit. Allergies:  Amoxicillin, Erythromycin, and Pcn [penicillins]     Review of Systems:   A 14 point review of symptoms completed. Pertinent positives identified in the HPI, all other review of symptoms negative as below. Objective:     Vitals:    07/07/21 1120   BP: 112/80   Pulse: 80   Temp: 97.1 °F (36.2 °C)   SpO2: 98%    Weight: 278 lb (126.1 kg)       Wt Readings from Last 3 Encounters:   07/07/21 278 lb (126.1 kg)   11/11/20 291 lb (132 kg)   10/21/20 290 lb (131.5 kg)       PHYSICAL EXAM:    General:  Alert, cooperative, no distress, appears stated age   Head:  Normocephalic, atraumatic   Eyes:  Conjunctiva/corneas clear, anicteric sclerae    Nose: Nares normal, no drainage or sinus tenderness   Throat: No abnormalities of the lips, oral mucosa or tongue. Neck: Trachea midline. Neck supple with no lymphadenopathy, thyroid not enlarged, symmetric, no tenderness/mass/nodules, no Jugular venous pressure elevation    Lungs:   Clear to auscultation bilaterally, no wheezes, no rales, no respiratory distress   Chest Wall:  No deformity or tenderness to palpation   Heart:  Regular rate and rhythm, normal S1, normal S2, no murmur, no rub, no S3/S4, PMI non-displaced. No precordial heave. Abdomen:   Soft, non-tender, with normoactive bowel sounds. No masses, no hepatosplenomegaly   Extremities: No cyanosis, clubbing or pitting edema. Vascular: 2+ radial,  dorsalis pedis and posterior tibial pulses bilaterally. Brisk carotid upstrokes without carotid bruit. Skin: Skin color, texture, turgor are normal with no rashes or ulceration.     Pysch: Euthymic mood, appropriate affect   Neurologic: Oriented to person, place and time. No slurred speech or facial asymmetry. No motor or sensory deficits on gross examination. Labs:   CBC:   Lab Results   Component Value Date    WBC 14.2 2020    RBC 4.97 2020    RBC 4.90 2016    HGB 14.6 2020    HCT 43.4 2020    MCV 87.2 2020    RDW 13.6 2020     2020     CMP:  Lab Results   Component Value Date     10/26/2020    K 4.3 10/26/2020    K 4.2 2020     10/26/2020    CO2 23 10/26/2020    BUN 14 10/26/2020    CREATININE 0.8 10/26/2020    GFRAA >60 10/26/2020    GFRAA >60 2013    AGRATIO 1.7 10/26/2020    LABGLOM >60 10/26/2020    GLUCOSE 106 10/26/2020    PROT 7.2 10/26/2020    PROT 7.6 2013    CALCIUM 9.7 10/26/2020    BILITOT 0.3 10/26/2020    ALKPHOS 95 10/26/2020    AST 20 10/26/2020    ALT 43 10/26/2020     PT/INR:  No results found for: PTINR  HgBA1c:  Lab Results   Component Value Date    LABA1C 6.6 2020     No results found for: CKTOTAL, CKMB, CKMBINDEX, TROPONINI    The ASCVD Risk score (Robin Bauer et al., 2013) failed to calculate for the following reasons:    Cannot find a previous HDL lab    Cannot find a previous total cholesterol lab    Cardiac Data:   EKG 21 personally reviewed, notable for normal sinus rhythm with RSR', otherwise normal    EKG 21 OSH personally reviewed, notable for normal sinus rhythm with isolated PVC    EK17  Marked sinus bradycardia with sinus arrhythmia 48bpm Abnormal ECG When compared with ECG of 02-DEC-2015 18:04, Vent. rate has decreased BY  75 BPM Incomplete right bundle branch block is no longer Present Confirmed by Mckenna Farooq MD, [address] ([id number]) on 2017 7:26:13 AM     No prior cardiac studies    Impression and Plan:      1. Preventive cardiac care  2. Abnormal EKG with isolated PVC previously  3. Obesity due to excess calories, BMI 40  4. Obstructive sleep apnea, compliant with CPAP  5. Family history of coronary artery disease  6. Hypertension      Patient Active Problem List   Diagnosis    Attention deficit hyperactivity disorder (ADHD), predominantly inattentive type    HTN (hypertension)    Hyperglycemia    Family history of diabetes mellitus    Severe obstructive sleep apnea    Thrombocytosis (HCC)    Obesity, Class III, BMI 40-49.9 (morbid obesity) ([facility] [address]    Hydronephrosis with renal calculous obstruction    Kidney stone    Intractable pain    Adrenal tumor    Class 3 severe obesity with body mass index (BMI) of 40.0 to 44.9 in adult [facility])    History of adrenal adenoma    H/O partial adrenalectomy (HCC)    Hypercalciuria    Abnormal EKG       PLAN:  1. Will order a CT calcium score to get a score on calcium in arteries if score if high over 400 then we will order stress test  2. Cardiac risk stratification education was reviewed including diet, exercise/activity, medication,and tobacco/alcohol  Cessation; We encouraged modest weight loss through implementing appropriate dietary measures as well as initiation of a graded exercise program with the ultimate goal of 150 minutes of aerobic exercise weekly. 3. Will check lipids and once resulted we can decide on statin on assessing ASCVD risk score and coronary calcium score. 4.  He is no longer smoking  5. Continue current antihypertensive regimen  6. Will assess candidacy for baby aspirin daily on return of studies. Follow up in 3 months, I will call with results prior      This note was scribed in the presence of Clif Velasquez MD by Alba Hernandez RN    The scribes documentation has been prepared under my direction and personally reviewed by me in its entirety. I confirm that the note above accurately reflects all work, treatment, procedures, and medical decision making performed by me.   Yaritza Cook MD, personally performed the services described in this documentation as scribed by Alba Hernandez RN in my presence, and it is both accurate and complete to the best of our ability. I will address the patient's cardiac risk factors and adjusted pharmacologic treatment as needed. In addition, I have reinforced the need for patient directed risk factor modification. All questions and concerns were addressed to the patient/family. Alternatives to my treatment were discussed. Thank you for allowing us to participate in the care of Ross Armas. Please call me with any questions [phone].     Jez Johnson MD, [facility]  Cardiovascular Disease  [facility]  [phone] [facility]  [phone] [address] [place] [address]  7/7/2021 11:36 AM

## 2021-07-07 NOTE — PATIENT INSTRUCTIONS
1. Continue power walking max heart rate is 177 bpm ok to push heart rates above 150 bpm  2. Cardiac risk stratification education was reviewed including diet, exercise/activity, medication,and tobacco/alcohol  cessation  3. Will check lipids once resulted we can decide on statin  4. Will order a CT calcium score to get a score on calcium in arteries if score if high over 400 then we will order stress test  5.  Follow up in 3 months I will call with results prior

## 2021-07-09 ENCOUNTER — HOSPITAL ENCOUNTER (OUTPATIENT)
Age: 46
Discharge: HOME | End: 2021-07-09
Payer: COMMERCIAL

## 2021-07-09 DIAGNOSIS — Z82.49 FAMILY HISTORY OF CORONARY ARTERY DISEASE: ICD-10-CM

## 2021-07-09 DIAGNOSIS — I10 ESSENTIAL HYPERTENSION: ICD-10-CM

## 2021-07-09 LAB
CHOLESTEROL, TOTAL: 181 MG/DL (ref 0–199)
HDLC SERPL-MCNC: 32 MG/DL (ref 40–60)
LDL CHOLESTEROL CALCULATED: 102 MG/DL
TRIGL SERPL-MCNC: 235 MG/DL (ref 0–150)
VLDLC SERPL CALC-MCNC: 47 MG/DL

## 2021-07-09 PROCEDURE — 36415 COLL VENOUS BLD VENIPUNCTURE: CPT

## 2021-07-09 PROCEDURE — 80061 LIPID PANEL: CPT

## 2021-07-12 ENCOUNTER — TELEPHONE (OUTPATIENT)
Dept: CARDIOLOGY CLINIC | Age: 46
End: 2021-07-12

## 2021-07-12 NOTE — TELEPHONE ENCOUNTER
----- Message from Marybeth Polanco MD sent at 7/9/2021  5:26 PM EDT -----  Please let the patient know LDL or bad cholesterol as well as triglycerides are elevated. We will discuss treatment on return of coronary calcium score CT.   Thank you

## 2021-07-14 ENCOUNTER — HOSPITAL ENCOUNTER (OUTPATIENT)
Dept: CT IMAGING | Age: 46
Discharge: HOME | End: 2021-07-14
Payer: COMMERCIAL

## 2021-07-14 ENCOUNTER — TELEPHONE (OUTPATIENT)
Dept: CARDIOLOGY | Age: 46
End: 2021-07-14

## 2021-07-14 DIAGNOSIS — R94.31 ABNORMAL EKG: ICD-10-CM

## 2021-07-14 DIAGNOSIS — Z82.49 FAMILY HISTORY OF CORONARY ARTERY DISEASE: ICD-10-CM

## 2021-07-14 PROCEDURE — 75571 CT HRT W/O DYE W/CA TEST: CPT

## 2021-07-14 RX ORDER — ATORVASTATIN CALCIUM 20 MG/1
20 TABLET, FILM COATED ORAL NIGHTLY
Qty: 90 TABLET | Refills: 3 | Status: SHIPPED | OUTPATIENT
Start: 2021-07-14 | End: 2022-07-08

## 2021-07-14 NOTE — TELEPHONE ENCOUNTER
Spoke with patient regarding calcium score. 90th percentile, score 242. Recommended we start baby aspirin daily and nightly statin. Follow up 3 months with repeat Fasting lipids at that time. If doing well, will see yearly thereafter.  Questions answered,    Opal Rodas MD, [address] [facility]  [phone] [facility]  [phone] [address] [place] [address]

## 2021-08-30 ENCOUNTER — HOSPITAL ENCOUNTER (EMERGENCY)
Age: 46
Discharge: HOME | End: 2021-08-30
Payer: COMMERCIAL

## 2021-08-30 ENCOUNTER — APPOINTMENT (OUTPATIENT)
Dept: CT IMAGING | Age: 46
End: 2021-08-30
Payer: COMMERCIAL

## 2021-08-30 VITALS
RESPIRATION RATE: 16 BRPM | OXYGEN SATURATION: 98 % | DIASTOLIC BLOOD PRESSURE: 83 MMHG | SYSTOLIC BLOOD PRESSURE: 124 MMHG | HEART RATE: 68 BPM | TEMPERATURE: 97.8 F

## 2021-08-30 DIAGNOSIS — N13.30 HYDRONEPHROSIS OF RIGHT KIDNEY: ICD-10-CM

## 2021-08-30 DIAGNOSIS — R10.9 RIGHT FLANK PAIN: ICD-10-CM

## 2021-08-30 DIAGNOSIS — N20.1 RIGHT URETERAL STONE: Primary | ICD-10-CM

## 2021-08-30 DIAGNOSIS — R11.0 NAUSEA WITHOUT VOMITING: ICD-10-CM

## 2021-08-30 LAB
A/G RATIO: 1.9 (ref 1.1–2.2)
ALBUMIN SERPL-MCNC: 4.6 G/DL (ref 3.4–5)
ALP BLD-CCNC: 101 U/L (ref 40–129)
ALT SERPL-CCNC: 18 U/L (ref 10–40)
ANION GAP SERPL CALCULATED.3IONS-SCNC: 12 MMOL/L (ref 3–16)
AST SERPL-CCNC: 11 U/L (ref 15–37)
BASOPHILS ABSOLUTE: 0.1 K/UL (ref 0–0.2)
BASOPHILS RELATIVE PERCENT: 0.8 %
BILIRUB SERPL-MCNC: <0.2 MG/DL (ref 0–1)
BILIRUBIN URINE: NEGATIVE
BLOOD, URINE: ABNORMAL
BUN BLDV-MCNC: 14 MG/DL (ref 7–20)
CALCIUM SERPL-MCNC: 9.8 MG/DL (ref 8.3–10.6)
CHLORIDE BLD-SCNC: 107 MMOL/L (ref 99–110)
CLARITY: CLEAR
CO2: 22 MMOL/L (ref 21–32)
COLOR: ABNORMAL
CREAT SERPL-MCNC: 0.9 MG/DL (ref 0.9–1.3)
EOSINOPHILS ABSOLUTE: 0.6 K/UL (ref 0–0.6)
EOSINOPHILS RELATIVE PERCENT: 4.8 %
EPITHELIAL CELLS, UA: ABNORMAL /HPF (ref 0–5)
GFR AFRICAN AMERICAN: >60
GFR NON-AFRICAN AMERICAN: >60
GLOBULIN: 2.4 G/DL
GLUCOSE BLD-MCNC: 136 MG/DL (ref 70–99)
GLUCOSE URINE: NEGATIVE MG/DL
HCT VFR BLD CALC: 41.2 % (ref 40.5–52.5)
HEMOGLOBIN: 13.4 G/DL (ref 13.5–17.5)
KETONES, URINE: NEGATIVE MG/DL
LEUKOCYTE ESTERASE, URINE: NEGATIVE
LYMPHOCYTES ABSOLUTE: 2.4 K/UL (ref 1–5.1)
LYMPHOCYTES RELATIVE PERCENT: 18.9 %
MCH RBC QN AUTO: 29.8 PG (ref 26–34)
MCHC RBC AUTO-ENTMCNC: 32.6 G/DL (ref 31–36)
MCV RBC AUTO: 91.4 FL (ref 80–100)
MICROSCOPIC EXAMINATION: YES
MONOCYTES ABSOLUTE: 0.6 K/UL (ref 0–1.3)
MONOCYTES RELATIVE PERCENT: 4.5 %
MUCUS: ABNORMAL /LPF
NEUTROPHILS ABSOLUTE: 9 K/UL (ref 1.7–7.7)
NEUTROPHILS RELATIVE PERCENT: 71 %
NITRITE, URINE: NEGATIVE
PDW BLD-RTO: 13.2 % (ref 12.4–15.4)
PH UA: 6 (ref 5–8)
PLATELET # BLD: 323 K/UL (ref 135–450)
PMV BLD AUTO: 7.9 FL (ref 5–10.5)
POTASSIUM REFLEX MAGNESIUM: 4.6 MMOL/L (ref 3.5–5.1)
PROTEIN UA: NEGATIVE MG/DL
RBC # BLD: 4.51 M/UL (ref 4.2–5.9)
RBC UA: ABNORMAL /HPF (ref 0–4)
SODIUM BLD-SCNC: 141 MMOL/L (ref 136–145)
SPECIFIC GRAVITY UA: 1.01 (ref 1–1.03)
TOTAL PROTEIN: 7 G/DL (ref 6.4–8.2)
URINE REFLEX TO CULTURE: ABNORMAL
URINE TYPE: ABNORMAL
UROBILINOGEN, URINE: 0.2 E.U./DL
WBC # BLD: 12.6 K/UL (ref 4–11)
WBC UA: ABNORMAL /HPF (ref 0–5)

## 2021-08-30 PROCEDURE — 74176 CT ABD & PELVIS W/O CONTRAST: CPT

## 2021-08-30 PROCEDURE — 81001 URINALYSIS AUTO W/SCOPE: CPT

## 2021-08-30 PROCEDURE — 96375 TX/PRO/DX INJ NEW DRUG ADDON: CPT

## 2021-08-30 PROCEDURE — 99283 EMERGENCY DEPT VISIT LOW MDM: CPT

## 2021-08-30 PROCEDURE — 85025 COMPLETE CBC W/AUTO DIFF WBC: CPT

## 2021-08-30 PROCEDURE — 80053 COMPREHEN METABOLIC PANEL: CPT

## 2021-08-30 PROCEDURE — 96374 THER/PROPH/DIAG INJ IV PUSH: CPT

## 2021-08-30 PROCEDURE — 6360000002 HC RX W HCPCS: Performed by: NURSE PRACTITIONER

## 2021-08-30 RX ORDER — ONDANSETRON 4 MG/1
4 TABLET, ORALLY DISINTEGRATING ORAL EVERY 8 HOURS PRN
Qty: 20 TABLET | Refills: 0 | Status: SHIPPED | OUTPATIENT
Start: 2021-08-30

## 2021-08-30 RX ORDER — TAMSULOSIN HYDROCHLORIDE 0.4 MG/1
0.4 CAPSULE ORAL DAILY
Qty: 5 CAPSULE | Refills: 0 | Status: SHIPPED | OUTPATIENT
Start: 2021-08-30 | End: 2021-09-04

## 2021-08-30 RX ORDER — KETOROLAC TROMETHAMINE 30 MG/ML
30 INJECTION, SOLUTION INTRAMUSCULAR; INTRAVENOUS ONCE
Status: COMPLETED | OUTPATIENT
Start: 2021-08-30 | End: 2021-08-30

## 2021-08-30 RX ORDER — 0.9 % SODIUM CHLORIDE 0.9 %
1000 INTRAVENOUS SOLUTION INTRAVENOUS ONCE
Status: DISCONTINUED | OUTPATIENT
Start: 2021-08-30 | End: 2021-08-30 | Stop reason: HOSPADM

## 2021-08-30 RX ORDER — OXYCODONE HYDROCHLORIDE AND ACETAMINOPHEN 5; 325 MG/1; MG/1
1-2 TABLET ORAL EVERY 6 HOURS PRN
Qty: 20 TABLET | Refills: 0 | Status: SHIPPED | OUTPATIENT
Start: 2021-08-30 | End: 2021-09-02

## 2021-08-30 RX ORDER — ONDANSETRON 2 MG/ML
4 INJECTION INTRAMUSCULAR; INTRAVENOUS ONCE
Status: COMPLETED | OUTPATIENT
Start: 2021-08-30 | End: 2021-08-30

## 2021-08-30 RX ORDER — KETOROLAC TROMETHAMINE 10 MG/1
10 TABLET, FILM COATED ORAL EVERY 6 HOURS PRN
Qty: 20 TABLET | Refills: 0 | Status: SHIPPED | OUTPATIENT
Start: 2021-08-30 | End: 2022-08-11

## 2021-08-30 RX ADMIN — KETOROLAC TROMETHAMINE 30 MG: 30 INJECTION, SOLUTION INTRAMUSCULAR at 17:10

## 2021-08-30 RX ADMIN — ONDANSETRON HYDROCHLORIDE 4 MG: 2 INJECTION, SOLUTION INTRAMUSCULAR; INTRAVENOUS at 17:10

## 2021-08-30 RX ADMIN — Medication 1000 ML: at 17:11

## 2021-08-30 ASSESSMENT — ENCOUNTER SYMPTOMS
DIARRHEA: 0
WHEEZING: 0
COLOR CHANGE: 0
COUGH: 0
ABDOMINAL PAIN: 1
VOMITING: 0
BACK PAIN: 0
SHORTNESS OF BREATH: 0
NAUSEA: 1

## 2021-08-30 ASSESSMENT — PAIN SCALES - GENERAL
PAINLEVEL_OUTOF10: 9
PAINLEVEL_OUTOF10: 9
PAINLEVEL_OUTOF10: 6

## 2021-08-30 NOTE — ED PROVIDER NOTES
**ADVANCED PRACTICE PROVIDER, I HAVE EVALUATED THIS [facility] [place]  EMERGENCY DEPARTMENT ENCOUNTER      Pt Name: Ross Armas  QPR:[id number]  Sammygfolvin 1975  Date of evaluation: 8/30/2021  Provider: RAJINDER Morales - CNP      Chief Complaint:    Chief Complaint   Patient presents with    Flank Pain     patient believes he has a kidney stone, has a hx of kidney stones x10. also believes he may have UTI         Nursing Notes, Past Medical Hx, Past Surgical Hx, Social Hx, Allergies, and Family Hx were all reviewed and agreed with or any disagreements were addressed in the HPI.    HPI: (Location, Duration, Timing, Severity, Quality, Assoc Sx, Context, Modifying factors)    Chief Complaint of flank pain, concern of kidney stone    This is a  39 y.o. male who presents with right sided flank pain, that started yesterday, states that the pain has continued to worsen over the past day or so, he rates the pain 10/10 on initial exam with nursing. Who reports urgency but no frequency. Does report the pain wraps around to his abdomen has some slight nausea but no vomiting or diarrhea. He denies any cough, congestion, fever or chills. No chest pains or chest pain or shortness of breath. Has not taken any medicine for the pain, no additional complaints, no additional aggravating relieving factors. Patient presents awake, alert and in no acute distress or toxic appearance. He does see urology, He sees Dr. Cirsto Avery with urology.      PastMedical/Surgical History:      Diagnosis Date    Difficult airway for intubation 09/21/2020    good view glidescope, difficult to pass tube    Hypertension     Kidney stones     MRSA (methicillin resistant Staphylococcus aureus) 12/10/12    SAAD (obstructive sleep apnea)          Procedure Laterality Date    CYSTOSCOPY Right 9/21/2020    CYSTOSCOPY WITH STONE MANIPULIATION,RIGHT STENT PLACEMENT performed by Lisa Virgen MD at [address] OR    OTHER SURGICAL HISTORY  12/13/12    Wide Excision of Nonhealing Cysts Right Axilla and Bilateral Upper Inner Thighs    OTHER SURGICAL HISTORY  09/25/2013    cystoscopy, right retrograde, ureteroscopy, Holmium Laser lithotripsy, stone extraction    OTHER SURGICAL HISTORY  09/21/2020    cystoscopy with stone manipulation right stemnt placement       Medications:  Previous Medications    ATORVASTATIN (LIPITOR) 20 MG TABLET    Take 1 tablet by mouth nightly    FAMOTIDINE (PEPCID) 20 MG TABLET    Take 20 mg by mouth 2 times daily    LISINOPRIL (PRINIVIL;ZESTRIL) 20 MG TABLET    Take 1 tablet by mouth daily    METFORMIN (GLUCOPHAGE) 1000 MG TABLET        METOPROLOL (LOPRESSOR) 25 MG TABLET    Take 1 tablet by mouth 2 times daily. Review of Systems:  (2-9 systems needed)  Review of Systems   Constitutional: Negative for chills and fever. HENT: Negative for congestion. Respiratory: Negative for cough, shortness of breath and wheezing. Cardiovascular: Negative for chest pain. Gastrointestinal: Positive for abdominal pain and nausea. Negative for diarrhea and vomiting. Patient complains of right flank pain that started yesterday that wraps around to his abdomen has associated nausea but no vomiting or diarrhea, does report having history of kidney stones. Genitourinary: Positive for flank pain. Negative for difficulty urinating, dysuria, frequency, hematuria and urgency. Musculoskeletal: Negative for back pain. Skin: Negative for color change. Neurological: Negative for weakness, numbness and headaches. \"Positives and Pertinent negatives as per HPI\"    Physical Exam:  Physical Exam  Vitals and nursing note reviewed. Constitutional:       Appearance: He is well-developed. He is not diaphoretic. HENT:      Head: Normocephalic. Right Ear: External ear normal.      Left Ear: External ear normal.   Eyes:      General: No scleral icterus. Right eye: No discharge. Left eye: No discharge. Cardiovascular:      Rate and Rhythm: Normal rate. Pulmonary:      Effort: Pulmonary effort is normal. No respiratory distress. Breath sounds: Normal breath sounds. Abdominal:      Palpations: Abdomen is soft. Tenderness: There is no abdominal tenderness. There is right CVA tenderness. Comments: Abdomen is grossly protuberant. Bowel sounds are hypoactive, patient has right-sided CVA tenderness but no abdominal tenderness guarding rebound tenderness. No ascites or rigidity. No rebound tenderness at McBurney's point. Musculoskeletal:         General: Normal range of motion. Cervical back: Normal range of motion and neck supple. Skin:     General: Skin is warm. Capillary Refill: Capillary refill takes less than 2 seconds. Coloration: Skin is not pale. Neurological:      General: No focal deficit present. Mental Status: He is alert and oriented to person, place, and time. GCS: GCS eye subscore is 4. GCS verbal subscore is 5. GCS motor subscore is 6.    Psychiatric:         Behavior: Behavior normal.         MEDICAL DECISION MAKING    Vitals:    Vitals:    08/30/21 1351 08/30/21 1354 08/30/21 1553   BP:  130/79    Pulse: 68  70   Resp: 18  20   Temp: 97.8 °F (36.6 °C)     TempSrc: Temporal     SpO2: 96%  97%       LABS:  Labs Reviewed   CBC WITH AUTO DIFFERENTIAL - Abnormal; Notable for the following components:       Result Value    WBC 12.6 (*)     Hemoglobin 13.4 (*)     Neutrophils Absolute 9.0 (*)     All other components within normal limits    Narrative:     Performed at:  [facility]) - [facility] [address],  [place], [facility]   Phone [phone]   COMPREHENSIVE METABOLIC PANEL W/ REFLEX TO MG FOR LOW K - Abnormal; Notable for the following components:    Glucose 136 (*)     AST 11 (*)     All other components within normal limits    Narrative:     Performed at:  [facility] Educated take medicine as prescribed. Increase his fluids. Return to ER for any worsening or concerning symptoms. The patient tolerated their visit well. I evaluated the patient. The physician was available for consultation as needed. The patient and / or the family were informed of the results of any tests, a time was given to answer questions, a plan was proposed and they agreed with plan. Patient verbalized understanding of discharge instructions and the patient was discharged from the department in stable condition. CLINICAL IMPRESSION:  1. Right ureteral stone    2. Hydronephrosis of right kidney    3. Right flank pain    4. Nausea without vomiting        DISPOSITION        PATIENT REFERRED TO:  Franko Devries, APRN - CNP  [address] [facility] [address] [facility]  [phone]    Schedule an appointment as soon as possible for a visit   Follow-up with your family doctor as needed    MD Amaya Wyatt [facility]  [facility] [address] [place] [address]  [phone]    Schedule an appointment as soon as possible for a visit in 2 days  Follow-up with your urologist in the next 2 days for reevaluation    [place] [facility]  [address] [facility] [address]  Go to   If symptoms worsen      DISCHARGE MEDICATIONS:  New Prescriptions    KETOROLAC (TORADOL) 10 MG TABLET    Take 1 tablet by mouth every 6 hours as needed for Pain    ONDANSETRON (ZOFRAN ODT) 4 MG DISINTEGRATING TABLET    Take 1 tablet by mouth every 8 hours as needed for Nausea    OXYCODONE-ACETAMINOPHEN (PERCOCET) 5-325 MG PER TABLET    Take 1-2 tablets by mouth every 6 hours as needed for Pain for up to 3 days. WARNING:  May cause drowsiness. May impair ability to operate vehicles or machinery. Do not use in combination with alcohol.     TAMSULOSIN (FLOMAX) 0.4 MG CAPSULE    Take 1 capsule by mouth daily for 5 doses       DISCONTINUED MEDICATIONS:  Discontinued Medications    No medications on file              (Please note the MDM and HPI sections of this note were completed with a voice recognition program.  Efforts were made to edit the dictations but occasionally words are mis-transcribed.)    Electronically signed, RAJINDER Morales CNP,          RAJINDER Morales CNP  08/30/21 5848

## 2021-09-01 ENCOUNTER — HOSPITAL ENCOUNTER (OUTPATIENT)
Age: 46
Discharge: HOME | End: 2021-09-01
Payer: COMMERCIAL

## 2021-09-01 PROCEDURE — U0003 INFECTIOUS AGENT DETECTION BY NUCLEIC ACID (DNA OR RNA); SEVERE ACUTE RESPIRATORY SYNDROME CORONAVIRUS 2 (SARS-COV-2) (CORONAVIRUS DISEASE [COVID-19]), AMPLIFIED PROBE TECHNIQUE, MAKING USE OF HIGH THROUGHPUT TECHNOLOGIES AS DESCRIBED BY CMS-2020-01-R: HCPCS

## 2021-09-01 PROCEDURE — U0005 INFEC AGEN DETEC AMPLI PROBE: HCPCS

## 2021-09-01 NOTE — PROGRESS NOTES
[facility] Preoperative Screening for Elective Surgery/Invasive Procedures While COVID-19 present in the community     Have you had any of the following symptoms? o Fever, chills  o Cough  o Shortness of breath  o Muscle aches/pain  o Diarrhea  o Abdominal pain, nausea, vomiting  o Loss or decrease in taste and / or smell   Risk of Exposure  o Have you recently been hospitalized for COVID-19 or flu-like illness, if so when?  o Recently diagnosed with COVID-19, if so when?  o Recently tested for COVID-19, if so when?  o Have you been in close contact with a person or family member who currently has or recently had COVID-19? If yes, when and in what context?  o Do you live with anybody who in the last 14 days has had fever, chills, shortness of breath, muscle aches, flu-like illness?  o Do you have any close contacts or family members who are currently in the hospital for COVID-19 or flu-like illness? If yes, assess recent close contact with this person. Indicate if the patient has a positive screen by answering yes to one or more of the above questions. Patients who test positive or screen positive prior to surgery or on the day of surgery should be evaluated in conjunction with the surgeon/proceduralist/anesthesiologist to determine the urgency of the procedure.

## 2021-09-01 NOTE — PROGRESS NOTES
.1.  Do not eat or drink anything after 12 midnight prior to surgery. This includes no water, chewing gum or mints. 2.  Take the following pills with a small sip of water on the morning of surgery 09/03/2021.  3.  Aspirin, Ibuprofen, Advil, Naproxen, Vitamin E and other Anti-inflammatory products should be stopped for 5 days before surgery or as directed by your physician. 4.  Check with your doctor regarding stopping Plavix, Coumadin, Lovenox, Fragmin or other blood thinners. 5.  Do not smoke and do not drink alcoholic beverages 24 hours prior to surgery. This includes NA Beer. 6.  You may brush your teeth and gargle the morning of surgery. DO NOT SWALLOW WATER.  7.  You MUST make arrangements for a responsible adult to take you home after your surgery. You will not be allowed to leave alone or drive yourself home. It is strongly suggested someone stay with you the first 24 hours. Your surgery will be cancelled if you do not have a ride home. 8.  A parent/legal guardian must accompany a child scheduled for surgery and plan to stay at the hospital until the child is discharged. Please do not bring other children with you. 9.  Please wear simple, loose fitting clothing to the hospital.  [place] not bring valuables ( money, credit cards, checkbooks, etc.)  Do not wear any makeup (including no eye makeup) or nail polish on your fingers or toes. 10.  Do not wear any jewelry or piercing on the day of surgery. All body piercing jewelry must be removed. 11.  If you have dentures, they will be removed before going to the OR; we will provide you a container. If you wear contact lenses or glasses, they will be removed; please bring a case for them. 12.  Please see your family doctor/pediatrician for a history & physical and/or concerning medications. Bring any test results/reports from your physician's office the day of surgery.     13.  Remember to bring Blood Bank Bracelet to the hospital on the day of

## 2021-09-02 ENCOUNTER — ANESTHESIA EVENT (OUTPATIENT)
Dept: OPERATING ROOM | Age: 46
End: 2021-09-02
Payer: COMMERCIAL

## 2021-09-02 LAB — SARS-COV-2: NOT DETECTED

## 2021-09-03 ENCOUNTER — APPOINTMENT (OUTPATIENT)
Dept: GENERAL RADIOLOGY | Age: 46
End: 2021-09-03
Attending: UROLOGY
Payer: COMMERCIAL

## 2021-09-03 ENCOUNTER — ANESTHESIA (OUTPATIENT)
Dept: OPERATING ROOM | Age: 46
End: 2021-09-03
Payer: COMMERCIAL

## 2021-09-03 ENCOUNTER — HOSPITAL ENCOUNTER (OUTPATIENT)
Age: 46
Setting detail: OUTPATIENT SURGERY
Discharge: HOME | End: 2021-09-03
Attending: UROLOGY | Admitting: UROLOGY
Payer: COMMERCIAL

## 2021-09-03 VITALS
SYSTOLIC BLOOD PRESSURE: 97 MMHG | RESPIRATION RATE: 16 BRPM | DIASTOLIC BLOOD PRESSURE: 59 MMHG | OXYGEN SATURATION: 91 %

## 2021-09-03 VITALS
HEIGHT: 71 IN | OXYGEN SATURATION: 97 % | SYSTOLIC BLOOD PRESSURE: 131 MMHG | RESPIRATION RATE: 21 BRPM | TEMPERATURE: 97.2 F | BODY MASS INDEX: 36.68 KG/M2 | WEIGHT: 262 LBS | HEART RATE: 75 BPM | DIASTOLIC BLOOD PRESSURE: 73 MMHG

## 2021-09-03 LAB
GLUCOSE BLD-MCNC: 89 MG/DL (ref 70–99)
PERFORMED ON: NORMAL

## 2021-09-03 PROCEDURE — 6360000004 HC RX CONTRAST MEDICATION: Performed by: UROLOGY

## 2021-09-03 PROCEDURE — 2709999900 HC NON-CHARGEABLE SUPPLY: Performed by: UROLOGY

## 2021-09-03 PROCEDURE — 3600000004 HC SURGERY LEVEL 4 BASE: Performed by: UROLOGY

## 2021-09-03 PROCEDURE — 2580000003 HC RX 258: Performed by: NURSE ANESTHETIST, CERTIFIED REGISTERED

## 2021-09-03 PROCEDURE — 2580000003 HC RX 258: Performed by: UROLOGY

## 2021-09-03 PROCEDURE — 7100000010 HC PHASE II RECOVERY - FIRST 15 MIN: Performed by: UROLOGY

## 2021-09-03 PROCEDURE — C1769 GUIDE WIRE: HCPCS | Performed by: UROLOGY

## 2021-09-03 PROCEDURE — 6360000002 HC RX W HCPCS: Performed by: UROLOGY

## 2021-09-03 PROCEDURE — 6360000002 HC RX W HCPCS: Performed by: NURSE ANESTHETIST, CERTIFIED REGISTERED

## 2021-09-03 PROCEDURE — 3209999900 FLUORO FOR SURGICAL PROCEDURES

## 2021-09-03 PROCEDURE — 7100000000 HC PACU RECOVERY - FIRST 15 MIN: Performed by: UROLOGY

## 2021-09-03 PROCEDURE — 3600000014 HC SURGERY LEVEL 4 ADDTL 15MIN: Performed by: UROLOGY

## 2021-09-03 PROCEDURE — C2617 STENT, NON-COR, TEM W/O DEL: HCPCS | Performed by: UROLOGY

## 2021-09-03 PROCEDURE — 3700000000 HC ANESTHESIA ATTENDED CARE: Performed by: UROLOGY

## 2021-09-03 PROCEDURE — 3700000001 HC ADD 15 MINUTES (ANESTHESIA): Performed by: UROLOGY

## 2021-09-03 PROCEDURE — 7100000001 HC PACU RECOVERY - ADDTL 15 MIN: Performed by: UROLOGY

## 2021-09-03 PROCEDURE — 2720000010 HC SURG SUPPLY STERILE: Performed by: UROLOGY

## 2021-09-03 PROCEDURE — 7100000011 HC PHASE II RECOVERY - ADDTL 15 MIN: Performed by: UROLOGY

## 2021-09-03 PROCEDURE — 2500000003 HC RX 250 WO HCPCS: Performed by: NURSE ANESTHETIST, CERTIFIED REGISTERED

## 2021-09-03 PROCEDURE — 2580000003 HC RX 258: Performed by: ANESTHESIOLOGY

## 2021-09-03 PROCEDURE — C1758 CATHETER, URETERAL: HCPCS | Performed by: UROLOGY

## 2021-09-03 DEVICE — STENT URET 6FR L26CM PERCFLX HYDR+ TAPR TIP GRAD: Type: IMPLANTABLE DEVICE | Site: URETER | Status: FUNCTIONAL

## 2021-09-03 RX ORDER — DIPHENHYDRAMINE HYDROCHLORIDE 50 MG/ML
12.5 INJECTION INTRAMUSCULAR; INTRAVENOUS
Status: DISCONTINUED | OUTPATIENT
Start: 2021-09-03 | End: 2021-09-03 | Stop reason: HOSPADM

## 2021-09-03 RX ORDER — SODIUM CHLORIDE, SODIUM LACTATE, POTASSIUM CHLORIDE, CALCIUM CHLORIDE 600; 310; 30; 20 MG/100ML; MG/100ML; MG/100ML; MG/100ML
INJECTION, SOLUTION INTRAVENOUS CONTINUOUS PRN
Status: DISCONTINUED | OUTPATIENT
Start: 2021-09-03 | End: 2021-09-03 | Stop reason: SDUPTHER

## 2021-09-03 RX ORDER — PROPOFOL 10 MG/ML
INJECTION, EMULSION INTRAVENOUS PRN
Status: DISCONTINUED | OUTPATIENT
Start: 2021-09-03 | End: 2021-09-03 | Stop reason: SDUPTHER

## 2021-09-03 RX ORDER — DEXAMETHASONE SODIUM PHOSPHATE 4 MG/ML
INJECTION, SOLUTION INTRA-ARTICULAR; INTRALESIONAL; INTRAMUSCULAR; INTRAVENOUS; SOFT TISSUE PRN
Status: DISCONTINUED | OUTPATIENT
Start: 2021-09-03 | End: 2021-09-03 | Stop reason: SDUPTHER

## 2021-09-03 RX ORDER — SODIUM CHLORIDE 0.9 % (FLUSH) 0.9 %
10 SYRINGE (ML) INJECTION EVERY 12 HOURS SCHEDULED
Status: DISCONTINUED | OUTPATIENT
Start: 2021-09-03 | End: 2021-09-03 | Stop reason: HOSPADM

## 2021-09-03 RX ORDER — MAGNESIUM HYDROXIDE 1200 MG/15ML
LIQUID ORAL PRN
Status: DISCONTINUED | OUTPATIENT
Start: 2021-09-03 | End: 2021-09-03 | Stop reason: ALTCHOICE

## 2021-09-03 RX ORDER — PROMETHAZINE HYDROCHLORIDE 25 MG/ML
6.25 INJECTION, SOLUTION INTRAMUSCULAR; INTRAVENOUS
Status: DISCONTINUED | OUTPATIENT
Start: 2021-09-03 | End: 2021-09-03 | Stop reason: HOSPADM

## 2021-09-03 RX ORDER — SODIUM CHLORIDE 9 MG/ML
25 INJECTION, SOLUTION INTRAVENOUS PRN
Status: DISCONTINUED | OUTPATIENT
Start: 2021-09-03 | End: 2021-09-03 | Stop reason: HOSPADM

## 2021-09-03 RX ORDER — LABETALOL HYDROCHLORIDE 5 MG/ML
5 INJECTION, SOLUTION INTRAVENOUS EVERY 10 MIN PRN
Status: DISCONTINUED | OUTPATIENT
Start: 2021-09-03 | End: 2021-09-03 | Stop reason: HOSPADM

## 2021-09-03 RX ORDER — SODIUM CHLORIDE 0.9 % (FLUSH) 0.9 %
10 SYRINGE (ML) INJECTION PRN
Status: DISCONTINUED | OUTPATIENT
Start: 2021-09-03 | End: 2021-09-03 | Stop reason: HOSPADM

## 2021-09-03 RX ORDER — OXYCODONE HYDROCHLORIDE AND ACETAMINOPHEN 5; 325 MG/1; MG/1
2 TABLET ORAL PRN
Status: DISCONTINUED | OUTPATIENT
Start: 2021-09-03 | End: 2021-09-03 | Stop reason: HOSPADM

## 2021-09-03 RX ORDER — LEVOFLOXACIN 5 MG/ML
500 INJECTION, SOLUTION INTRAVENOUS ONCE
Status: COMPLETED | OUTPATIENT
Start: 2021-09-03 | End: 2021-09-03

## 2021-09-03 RX ORDER — KETOROLAC TROMETHAMINE 30 MG/ML
INJECTION, SOLUTION INTRAMUSCULAR; INTRAVENOUS PRN
Status: DISCONTINUED | OUTPATIENT
Start: 2021-09-03 | End: 2021-09-03 | Stop reason: SDUPTHER

## 2021-09-03 RX ORDER — MEPERIDINE HYDROCHLORIDE 25 MG/ML
12.5 INJECTION INTRAMUSCULAR; INTRAVENOUS; SUBCUTANEOUS EVERY 5 MIN PRN
Status: DISCONTINUED | OUTPATIENT
Start: 2021-09-03 | End: 2021-09-03 | Stop reason: HOSPADM

## 2021-09-03 RX ORDER — OXYCODONE HYDROCHLORIDE AND ACETAMINOPHEN 5; 325 MG/1; MG/1
1 TABLET ORAL PRN
Status: DISCONTINUED | OUTPATIENT
Start: 2021-09-03 | End: 2021-09-03 | Stop reason: HOSPADM

## 2021-09-03 RX ORDER — ROCURONIUM BROMIDE 10 MG/ML
INJECTION, SOLUTION INTRAVENOUS PRN
Status: DISCONTINUED | OUTPATIENT
Start: 2021-09-03 | End: 2021-09-03 | Stop reason: SDUPTHER

## 2021-09-03 RX ORDER — HYDRALAZINE HYDROCHLORIDE 20 MG/ML
5 INJECTION INTRAMUSCULAR; INTRAVENOUS EVERY 10 MIN PRN
Status: DISCONTINUED | OUTPATIENT
Start: 2021-09-03 | End: 2021-09-03 | Stop reason: HOSPADM

## 2021-09-03 RX ORDER — LIDOCAINE HYDROCHLORIDE 10 MG/ML
1 INJECTION, SOLUTION EPIDURAL; INFILTRATION; INTRACAUDAL; PERINEURAL
Status: DISCONTINUED | OUTPATIENT
Start: 2021-09-03 | End: 2021-09-03 | Stop reason: HOSPADM

## 2021-09-03 RX ORDER — MORPHINE SULFATE 2 MG/ML
1 INJECTION, SOLUTION INTRAMUSCULAR; INTRAVENOUS EVERY 5 MIN PRN
Status: DISCONTINUED | OUTPATIENT
Start: 2021-09-03 | End: 2021-09-03 | Stop reason: HOSPADM

## 2021-09-03 RX ORDER — ONDANSETRON 2 MG/ML
INJECTION INTRAMUSCULAR; INTRAVENOUS PRN
Status: DISCONTINUED | OUTPATIENT
Start: 2021-09-03 | End: 2021-09-03 | Stop reason: SDUPTHER

## 2021-09-03 RX ORDER — SODIUM CHLORIDE, SODIUM LACTATE, POTASSIUM CHLORIDE, CALCIUM CHLORIDE 600; 310; 30; 20 MG/100ML; MG/100ML; MG/100ML; MG/100ML
INJECTION, SOLUTION INTRAVENOUS CONTINUOUS
Status: DISCONTINUED | OUTPATIENT
Start: 2021-09-03 | End: 2021-09-03 | Stop reason: HOSPADM

## 2021-09-03 RX ORDER — LIDOCAINE HYDROCHLORIDE 20 MG/ML
INJECTION, SOLUTION INFILTRATION; PERINEURAL PRN
Status: DISCONTINUED | OUTPATIENT
Start: 2021-09-03 | End: 2021-09-03 | Stop reason: SDUPTHER

## 2021-09-03 RX ORDER — ONDANSETRON 2 MG/ML
4 INJECTION INTRAMUSCULAR; INTRAVENOUS PRN
Status: DISCONTINUED | OUTPATIENT
Start: 2021-09-03 | End: 2021-09-03 | Stop reason: HOSPADM

## 2021-09-03 RX ORDER — FENTANYL CITRATE 50 UG/ML
INJECTION, SOLUTION INTRAMUSCULAR; INTRAVENOUS PRN
Status: DISCONTINUED | OUTPATIENT
Start: 2021-09-03 | End: 2021-09-03 | Stop reason: SDUPTHER

## 2021-09-03 RX ORDER — OXYBUTYNIN CHLORIDE 10 MG/1
10 TABLET, EXTENDED RELEASE ORAL DAILY PRN
Qty: 30 TABLET | Refills: 3 | Status: SHIPPED | OUTPATIENT
Start: 2021-09-03

## 2021-09-03 RX ORDER — SUCCINYLCHOLINE CHLORIDE 20 MG/ML
INJECTION INTRAMUSCULAR; INTRAVENOUS PRN
Status: DISCONTINUED | OUTPATIENT
Start: 2021-09-03 | End: 2021-09-03 | Stop reason: SDUPTHER

## 2021-09-03 RX ORDER — MORPHINE SULFATE 2 MG/ML
2 INJECTION, SOLUTION INTRAMUSCULAR; INTRAVENOUS EVERY 5 MIN PRN
Status: DISCONTINUED | OUTPATIENT
Start: 2021-09-03 | End: 2021-09-03 | Stop reason: HOSPADM

## 2021-09-03 RX ADMIN — SUCCINYLCHOLINE CHLORIDE 160 MG: 20 INJECTION, SOLUTION INTRAMUSCULAR; INTRAVENOUS at 13:40

## 2021-09-03 RX ADMIN — PROPOFOL 280 MG: 10 INJECTION, EMULSION INTRAVENOUS at 13:40

## 2021-09-03 RX ADMIN — LIDOCAINE HYDROCHLORIDE 50 MG: 20 INJECTION, SOLUTION INFILTRATION; PERINEURAL at 13:40

## 2021-09-03 RX ADMIN — SODIUM CHLORIDE, POTASSIUM CHLORIDE, SODIUM LACTATE AND CALCIUM CHLORIDE: 600; 310; 30; 20 INJECTION, SOLUTION INTRAVENOUS at 12:44

## 2021-09-03 RX ADMIN — ROCURONIUM BROMIDE 5 MG: 10 INJECTION, SOLUTION INTRAVENOUS at 13:40

## 2021-09-03 RX ADMIN — ONDANSETRON 4 MG: 2 INJECTION, SOLUTION INTRAMUSCULAR; INTRAVENOUS at 13:49

## 2021-09-03 RX ADMIN — LEVOFLOXACIN 500 MG: 5 INJECTION, SOLUTION INTRAVENOUS at 12:43

## 2021-09-03 RX ADMIN — ROCURONIUM BROMIDE 25 MG: 10 INJECTION, SOLUTION INTRAVENOUS at 13:45

## 2021-09-03 RX ADMIN — SODIUM CHLORIDE, POTASSIUM CHLORIDE, SODIUM LACTATE AND CALCIUM CHLORIDE: 600; 310; 30; 20 INJECTION, SOLUTION INTRAVENOUS at 13:36

## 2021-09-03 RX ADMIN — FENTANYL CITRATE 50 MCG: 50 INJECTION INTRAMUSCULAR; INTRAVENOUS at 13:36

## 2021-09-03 RX ADMIN — SUGAMMADEX 200 MG: 100 INJECTION, SOLUTION INTRAVENOUS at 14:16

## 2021-09-03 RX ADMIN — DEXAMETHASONE SODIUM PHOSPHATE 8 MG: 4 INJECTION, SOLUTION INTRAMUSCULAR; INTRAVENOUS at 13:49

## 2021-09-03 RX ADMIN — FENTANYL CITRATE 50 MCG: 50 INJECTION INTRAMUSCULAR; INTRAVENOUS at 13:40

## 2021-09-03 RX ADMIN — KETOROLAC TROMETHAMINE 30 MG: 30 INJECTION, SOLUTION INTRAMUSCULAR at 14:12

## 2021-09-03 RX ADMIN — LIDOCAINE HYDROCHLORIDE 100 MG: 20 INJECTION, SOLUTION INFILTRATION; PERINEURAL at 14:00

## 2021-09-03 ASSESSMENT — PULMONARY FUNCTION TESTS
PIF_VALUE: 23
PIF_VALUE: 21
PIF_VALUE: 21
PIF_VALUE: 23
PIF_VALUE: 21
PIF_VALUE: 25
PIF_VALUE: 21
PIF_VALUE: 22
PIF_VALUE: 21
PIF_VALUE: 22
PIF_VALUE: 18
PIF_VALUE: 21
PIF_VALUE: 1
PIF_VALUE: 4
PIF_VALUE: 4
PIF_VALUE: 21
PIF_VALUE: 3
PIF_VALUE: 4
PIF_VALUE: 21
PIF_VALUE: 1
PIF_VALUE: 27
PIF_VALUE: 21
PIF_VALUE: 26
PIF_VALUE: 22
PIF_VALUE: 21
PIF_VALUE: 22
PIF_VALUE: 22
PIF_VALUE: 18
PIF_VALUE: 21
PIF_VALUE: 22
PIF_VALUE: 3
PIF_VALUE: 20
PIF_VALUE: 22
PIF_VALUE: 21
PIF_VALUE: 0
PIF_VALUE: 22
PIF_VALUE: 20
PIF_VALUE: 9
PIF_VALUE: 24
PIF_VALUE: 0
PIF_VALUE: 23
PIF_VALUE: 9
PIF_VALUE: 21
PIF_VALUE: 22
PIF_VALUE: 21
PIF_VALUE: 22

## 2021-09-03 NOTE — BRIEF OP NOTE
Dict:  [id number]    Brief Postoperative Note      Patient: Stacie Benton  YOB: 1975  MRN: [id number]    Date of Procedure: 9/3/2021    Pre-Op Diagnosis: RIGHT URETERAL CALCULUS    Post-Op Diagnosis: Same       Procedure(s):  CYSTOSCOPY, RIGHT URETEROSCOPY WITH HOLMIUM LASER LITHOTRIPSY,  RETROGRADE PYELOGRAM, URETERAL STENT INSERTION    Surgeon(s):  Susan Arshad MD    Assistant:  * No surgical staff found *    Anesthesia: General    Estimated Blood Loss (mL): Minimal    Complications: None    Specimens:   ID Type Source Tests Collected by Time Destination   A :  Stone (Calculus) Kidney SURGICAL PATHOLOGY Susan Arshad MD 9/3/2021 1400        Implants:  Implant Name Type Inv.  Item Serial No.  Lot No. LRB No. Used Action   STENT URET 6FR L26CM PERCFLX HYDR+ TAPR TIP Bernardo Barks 6FR L26CM PERCFLX HYDR+ TAPR TIP GRAD  [employer] [facility] UROLOGY- [id number] Right 1 Implanted         Drains: * No LDAs found *    Findings: dictate    Fu flexible uretero at time of probable stent removal with possible laser if any residual stone seen    Electronically signed by Susan Arshad MD on 9/3/2021 at 4:50 PM

## 2021-09-03 NOTE — ANESTHESIA PRE PROCEDURE
Department of Anesthesiology  Preprocedure Note       Name:  Luis Enrique Morel   Age:  39 y.o.  :  1975                                          MRN:  [id number]         Date:  9/3/2021      Surgeon: Nelly Bonilla):  Garrett Bowen MD    Procedure: Procedure(s):  CYSTOSCOPY, RIGHT URETEROSCOPY WITH HOLMIUM LASER LITHOTRIPSY, POSSIBLE STENT INSERTION    Medications prior to admission:   Prior to Admission medications    Medication Sig Start Date End Date Taking? Authorizing Provider   ketorolac (TORADOL) 10 MG tablet Take 1 tablet by mouth every 6 hours as needed for Pain 21 Yes RAJINDER Daly CNP   tamsulosin (FLOMAX) 0.4 MG capsule Take 1 capsule by mouth daily for 5 doses 21 Yes RAJINDER Daly CNP   atorvastatin (LIPITOR) 20 MG tablet Take 1 tablet by mouth nightly 21 Yes Smita Saunders MD   famotidine (PEPCID) 20 MG tablet Take 20 mg by mouth 2 times daily   Yes Historical Provider, MD   metFORMIN (GLUCOPHAGE) 1000 MG tablet Take 1,000 mg by mouth 2 times daily (with meals)  20  Yes Historical Provider, MD   lisinopril (PRINIVIL;ZESTRIL) 20 MG tablet Take 1 tablet by mouth daily  Patient taking differently: Take 10 mg by mouth daily  11/18/15  Yes Terri James MD   metoprolol (LOPRESSOR) 25 MG tablet Take 1 tablet by mouth 2 times daily.  14  Yes Nahum Gonzales MD   ondansetron (ZOFRAN ODT) 4 MG disintegrating tablet Take 1 tablet by mouth every 8 hours as needed for Nausea 21   RAJINDER Daly CNP       Current medications:    Current Facility-Administered Medications   Medication Dose Route Frequency Provider Last Rate Last Admin    levoFLOXacin (LEVAQUIN) 500 MG/100ML infusion 500 mg  500 mg IntraVENous Once Garrett Bowen  mL/hr at 21 1243 500 mg at 21 1243    lactated ringers infusion   IntraVENous Continuous Constantine Wright  mL/hr at 21 1244 New Bag at 21 1244    sodium chloride flush 0.9 % injection 10 mL  10 mL IntraVENous 2 times per day Calvin Bruce MD        sodium chloride flush 0.9 % injection 10 mL  10 mL IntraVENous PRN Calvin Bruce MD        0.9 % sodium chloride infusion  25 mL IntraVENous PRN Calvin Bruce MD        lidocaine PF 1 % injection 1 mL  1 mL IntraDERmal Once PRN Calvin Bruce MD           Allergies:     Allergies   Allergen Reactions    Amoxicillin Nausea Only    Erythromycin     Pcn [Penicillins] Hives       Problem List:    Patient Active Problem List   Diagnosis Code    Attention deficit hyperactivity disorder (ADHD), predominantly inattentive type F90.0    HTN (hypertension) I10    Hyperglycemia R73.9    Family history of diabetes mellitus Z83.3    Severe obstructive sleep apnea G47.33    Thrombocytosis ([facility]) D47.3    Obesity, Class III, BMI 40-49.9 (morbid obesity) ([facility]) E66.01    Hydronephrosis with renal calculous obstruction N13.2    Kidney stone N20.0    Intractable pain R52    Adrenal tumor D49.7    Class 3 severe obesity with body mass index (BMI) of 40.0 to 44.9 in adult ([facility]) E66.01, Z68.41    History of adrenal adenoma Z86.018    H/O partial adrenalectomy ([facility]) E89.6    Hypercalciuria R82.994    Abnormal EKG R94.31       Past Medical History:        Diagnosis Date    Difficult airway for intubation 09/21/2020    good view glidescope, difficult to pass tube    Hypertension     Kidney stones     MRSA (methicillin resistant Staphylococcus aureus) 12/10/12    SAAD (obstructive sleep apnea)        Past Surgical History:        Procedure Laterality Date    CYSTOSCOPY Right 9/21/2020    CYSTOSCOPY WITH STONE MANIPULIATION,RIGHT STENT PLACEMENT performed by Shahriar Lou MD at [facility]  12/13/12    Wide Excision of Nonhealing Cysts Right Axilla and Bilateral Upper Inner Thighs    OTHER SURGICAL HISTORY  09/25/2013    cystoscopy, right retrograde, 08/30/2021    BILITOT <0.2 08/30/2021    ALKPHOS 101 08/30/2021    AST 11 08/30/2021    ALT 18 08/30/2021       POC Tests:   Recent Labs     09/03/21  1242   POCGLU 89       Coags: No results found for: PROTIME, INR, APTT    HCG (If Applicable): No results found for: PREGTESTUR, PREGSERUM, HCG, HCGQUANT     ABGs: No results found for: PHART, PO2ART, CLO7CAC, KMZ0DWI, BEART, S1HEQBFZ     Type & Screen (If Applicable):  No results found for: LABABO, LABRH    Drug/Infectious Status (If Applicable):  No results found for: HIV, HEPCAB    COVID-19 Screening (If Applicable):   Lab Results   Component Value Date    COVID19 Not Detected 09/01/2021           Anesthesia Evaluation  Patient summary reviewed and Nursing notes reviewed  Airway: Mallampati: IV  TM distance: <3 FB   Neck ROM: full  Mouth opening: < 3 FB Dental: normal exam         Pulmonary:Negative Pulmonary ROS and normal exam  breath sounds clear to auscultation  (+) sleep apnea: on CPAP,                             Cardiovascular:    (+) hypertension: mild,         Rhythm: regular  Rate: normal                    Neuro/Psych:   (+) psychiatric history:            GI/Hepatic/Renal:   (+) renal disease: kidney stones,           Endo/Other: Negative Endo/Other ROS                    Abdominal:   (+) obese,           Vascular: negative vascular ROS. Other Findings:             Anesthesia Plan      general     ASA 3       Induction: intravenous. MIPS: Postoperative opioids intended and Prophylactic antiemetics administered. Anesthetic plan and risks discussed with patient. Plan discussed with CRNA.                   Santos Sorto MD   9/3/2021

## 2021-09-03 NOTE — H&P
Referring Provider:  No ref. provider found   Primary Care Provider:  RAJINDER Terry CNP       Urology Attending H/P Note     Reason for H/P: surgery and as below    HPI:  Tai Childress is a 39 y.o. male who presented with history of renal colic 6mm stone in ureter and another small stone    See office notes for further details. Past Medical History:   Diagnosis Date    Difficult airway for intubation 09/21/2020    good view glidescope, difficult to pass tube    Hypertension     Kidney stones     MRSA (methicillin resistant Staphylococcus aureus) 12/10/12    SAAD (obstructive sleep apnea)        Past Surgical History:   Procedure Laterality Date    CYSTOSCOPY Right 9/21/2020    CYSTOSCOPY WITH STONE MANIPULIATION,RIGHT STENT PLACEMENT [facility] [address] [facility]    Wide Excision of Nonhealing Cysts Right Axilla and Bilateral Upper Inner Thighs    OTHER SURGICAL HISTORY  09/25/2013    cystoscopy, right retrograde, ureteroscopy, Holmium Laser lithotripsy, stone extraction    OTHER SURGICAL HISTORY  09/21/2020    cystoscopy with stone manipulation right stemnt placement       Current epic outpatient medication list reviewed as well as inpatient meds:       No current facility-administered medications on file prior to encounter.      Current Outpatient Medications on File Prior to Encounter   Medication Sig Dispense Refill    ketorolac (TORADOL) 10 MG tablet Take 1 tablet by mouth every 6 hours as needed for Pain 20 tablet 0    tamsulosin (FLOMAX) 0.4 MG capsule Take 1 capsule by mouth daily for 5 doses 5 capsule 0    atorvastatin (LIPITOR) 20 MG tablet Take 1 tablet by mouth nightly 90 tablet 3    famotidine (PEPCID) 20 MG tablet Take 20 mg by mouth 2 times daily      metFORMIN (GLUCOPHAGE) 1000 MG tablet Take 1,000 mg by mouth 2 times daily (with meals)       lisinopril (PRINIVIL;ZESTRIL) 20 MG tablet Take 1 tablet by mouth daily (Patient taking differently: Take 10 mg by mouth daily ) 90 tablet 0    metoprolol (LOPRESSOR) 25 MG tablet Take 1 tablet by mouth 2 times daily. 180 tablet 1    ondansetron (ZOFRAN ODT) 4 MG disintegrating tablet Take 1 tablet by mouth every 8 hours as needed for Nausea 20 tablet 0       Current Facility-Administered Medications   Medication Dose Route Frequency Provider Last Rate Last Admin    levoFLOXacin (LEVAQUIN) 500 MG/100ML infusion 500 mg  500 mg IntraVENous Once Salud Amador  mL/hr at 21 1243 500 mg at 21 1243    lactated ringers infusion   IntraVENous Continuous Ran Parker  mL/hr at 21 1244 New Bag at 21 1244    sodium chloride flush 0.9 % injection 10 mL  10 mL IntraVENous 2 times per day Ran Parker MD        sodium chloride flush 0.9 % injection 10 mL  10 mL IntraVENous PRN Ran Parker MD        0.9 % sodium chloride infusion  25 mL IntraVENous PRN Ran Parker MD        lidocaine PF 1 % injection 1 mL  1 mL IntraDERmal Once PRN Ran Parker MD           Allergies   Allergen Reactions    Amoxicillin Nausea Only    Erythromycin     Pcn [Penicillins] Hives       Family History   Problem Relation Age of Onset    Diabetes Mother     Asthma Father     COPD Father        Social History     Tobacco Use    Smoking status: Former Smoker     Packs/day: 1.00     Years: 10.00     Pack years: 10.00     Types: Cigarettes     Quit date: 2013     Years since quittin.6    Smokeless tobacco: Former User     Types: Chew   Vaping Use    Vaping Use: Never used   Substance Use Topics    Alcohol use: Yes     Comment: occasional    Drug use: No     Comment: 6-7 yrs ago         Review of Systems: A 12 point ROS was performed and was unremarkable unless listed in the history of present illness. No intake/output data recorded.     Physical Exam:  Patient Vitals for the past 8 hrs:   BP Temp Temp src Pulse Resp SpO2   21

## 2021-09-04 NOTE — OP NOTE
iliac, it was too narrow to get through  that area. So thus, we could not do that. Thus, we then advanced a  laser fiber and after releasing the stone, we used a laser fiber and we  pulverized that stone in the upper ureter. Some of the pieces were  rather hard and moving within the upper ureter as it was severely  dilated likely from the chronic nature of the stone. We then attempted  to basket out some of those pieces; however, the pieces were mostly too  small to be basketed. Next, we then shot an x-ray. It appeared there could be some small  fragments that possibly migrated up in to the kidney. There were not  very large on x-ray. At this point, I felt it was best to leave a  stent. So, we did ensure our safety wire was in position. We injected  about 10 mL of contrast for interpretation and there was mild  hydronephrosis. Confirmed our wire was in place and there was no  extravasation of contrast throughout the length of the ureter. We then  backed off the ureteroscope and backloaded the wire through the  cystoscope and a 6-Lao x 26 cm double-J stent was placed crossing the  kidney under the direct vision, confirmed the curl in the bladder. I  then inspected and I thought I could see some fragments in the kidney on  the fluoroscopy. Bladder was drained. FOLLOWUP:  He will follow up in about 10 to 14 days. He will require a  cystoscopy with probable right ureteral stent removal, at the time of  the stent removal, we will perform flexible ureteroscopy to ensure there  is no large residual fragments and that will be a right flexible  ureteroscopy at the time of the stent removal with a possible laser. My  , Hilaria Fernandez, could arrange that through our orders already  in the system. He would not need follow up in the office for this and this was reviewed  with the family and the patient.         Kimberly Simeon MD    D: 09/03/2021 16:55:01       T: 09/04/2021 2:41:25 [email or web address]  Job#: [id number]     Doc#: [id number]    CC:

## 2021-09-10 ENCOUNTER — VIRTUAL VISIT (OUTPATIENT)
Dept: PULMONOLOGY | Age: 46
End: 2021-09-10
Payer: COMMERCIAL

## 2021-09-10 ENCOUNTER — TELEPHONE (OUTPATIENT)
Dept: PULMONOLOGY | Age: 46
End: 2021-09-10

## 2021-09-10 DIAGNOSIS — Z71.89 CPAP USE COUNSELING: ICD-10-CM

## 2021-09-10 DIAGNOSIS — G47.33 SEVERE OBSTRUCTIVE SLEEP APNEA: Primary | ICD-10-CM

## 2021-09-10 DIAGNOSIS — E66.9 OBESITY (BMI 30-39.9): ICD-10-CM

## 2021-09-10 PROCEDURE — 99213 OFFICE O/P EST LOW 20 MIN: CPT | Performed by: NURSE PRACTITIONER

## 2021-09-10 ASSESSMENT — SLEEP AND FATIGUE QUESTIONNAIRES
HOW LIKELY ARE YOU TO NOD OFF OR FALL ASLEEP WHILE SITTING QUIETLY AFTER LUNCH WITHOUT ALCOHOL: 0
HOW LIKELY ARE YOU TO NOD OFF OR FALL ASLEEP WHILE SITTING AND READING: 0
HOW LIKELY ARE YOU TO NOD OFF OR FALL ASLEEP WHILE SITTING AND TALKING TO SOMEONE: 0
HOW LIKELY ARE YOU TO NOD OFF OR FALL ASLEEP WHEN YOU ARE A PASSENGER IN A CAR FOR AN HOUR WITHOUT A BREAK: 0
HOW LIKELY ARE YOU TO NOD OFF OR FALL ASLEEP IN A CAR, WHILE STOPPED FOR A FEW MINUTES IN TRAFFIC: 0
HOW LIKELY ARE YOU TO NOD OFF OR FALL ASLEEP WHILE WATCHING TV: 0
ESS TOTAL SCORE: 2
HOW LIKELY ARE YOU TO NOD OFF OR FALL ASLEEP WHILE SITTING INACTIVE IN A PUBLIC PLACE: 0
HOW LIKELY ARE YOU TO NOD OFF OR FALL ASLEEP WHILE LYING DOWN TO REST IN THE AFTERNOON WHEN CIRCUMSTANCES PERMIT: 2

## 2021-09-10 NOTE — PROGRESS NOTES
CHIEF COMPLAINT: Sleep apnea follow up  Consulting provider: Dr. Jeniffer Szymanski is a 39 y.o. male for televideo appointment via video and audio doxy. me virtual visit for SAAD follow up. States he is doing well with CPAP. States he needs new CPAP. States his humidifier is not working properly. Patient is using CPAP 7-8 hrs/night. Using humidifier. No snoring on CPAP. The pressure is well tolerated. The mask is comfortable- full face arely view. No mask leak. No significant daytime sleepiness. No nodding off when driving. No dry nose or throat. No fatigue. Bedtime is 8-830 pm and rise time is 430-5 am. Sleep onset is 10-15 minutes. Wakes up 3 times at night total. 1-3 nocturia-more when having kidney stone. It takes few minutes to fall back a sleep. Rare naps during the day. No headache in am. No weight gain, -40 lbs. No caffienated beverages during the day. Rare alcohol. ESS is 2.         Past Medical History:   Diagnosis Date    Difficult airway for intubation 09/21/2020    good view glidescope, difficult to pass tube    Hypertension     Kidney stones     MRSA (methicillin resistant Staphylococcus aureus) 12/10/12    SAAD (obstructive sleep apnea)        Past Surgical History:        Procedure Laterality Date    BLADDER SURGERY Right 9/3/2021    CYSTOSCOPY, RIGHT URETEROSCOPY WITH HOLMIUM LASER LITHOTRIPSY,  RETROGRADE PYELOGRAM, URETERAL STENT INSERTION performed by Camille Goodell, MD at \[facility]\"" Right 9/21/2020    CYSTOSCOPY WITH STONE MANIPULIATION,RIGHT STENT PLACEMENT performed by Scarlett Zhang MD at [address] [place], [place] [address]  12/13/12    Wide Excision of Nonhealing Cysts Right Axilla and Bilateral Upper Inner Thighs    OTHER SURGICAL HISTORY  09/25/2013    cystoscopy, right retrograde, ureteroscopy, Holmium Laser lithotripsy, stone extraction    OTHER SURGICAL HISTORY  09/21/2020    cystoscopy with stone manipulation right stemnt placement    OTHER SURGICAL HISTORY  09/03/2021    CYSTOSCOPY, RIGHT URETEROSCOPY WITH HOLMIUM LASER LITHOTRIPSY, POSSIBLE STENT INSERTION - Right       Allergies:  is allergic to amoxicillin, erythromycin, and pcn [penicillins]. Social History:    TOBACCO:   reports that he quit smoking about 8 years ago. His smoking use included cigarettes. He has a 10.00 pack-year smoking history. He has quit using smokeless tobacco.  His smokeless tobacco use included chew. ETOH:   reports current alcohol use. Family History:       Problem Relation Age of Onset    Diabetes Mother     Asthma Father     COPD Father        Current Medications:    Current Outpatient Medications:     oxybutynin (DITROPAN XL) 10 MG extended release tablet, Take 1 tablet by mouth daily as needed (stent pain, urgency frequency of urination), Disp: 30 tablet, Rfl: 3    ketorolac (TORADOL) 10 MG tablet, Take 1 tablet by mouth every 6 hours as needed for Pain, Disp: 20 tablet, Rfl: 0    ondansetron (ZOFRAN ODT) 4 MG disintegrating tablet, Take 1 tablet by mouth every 8 hours as needed for Nausea, Disp: 20 tablet, Rfl: 0    atorvastatin (LIPITOR) 20 MG tablet, Take 1 tablet by mouth nightly, Disp: 90 tablet, Rfl: 3    famotidine (PEPCID) 20 MG tablet, Take 20 mg by mouth 2 times daily, Disp: , Rfl:     metFORMIN (GLUCOPHAGE) 1000 MG tablet, Take 1,000 mg by mouth 2 times daily (with meals) , Disp: , Rfl:     lisinopril (PRINIVIL;ZESTRIL) 20 MG tablet, Take 1 tablet by mouth daily (Patient taking differently: Take 10 mg by mouth daily ), Disp: 90 tablet, Rfl: 0    metoprolol (LOPRESSOR) 25 MG tablet, Take 1 tablet by mouth 2 times daily. , Disp: 180 tablet, Rfl: 1    tamsulosin (FLOMAX) 0.4 MG capsule, Take 1 capsule by mouth daily for 5 doses (Patient not taking: Reported on 9/10/2021), Disp: 5 capsule, Rfl: 0      Objective:   PHYSICAL EXAM:    There were no vitals taken for this visit.' on RA  Exam:  Gen: No acute distress, does not appear to be in pain. Appears well developed and nourished. HENT: Head is normocephalic and atraumatic. Normal appearing nose. External Ears normal.   Neck: No visualized mass. Trachea is midline   Eyes: EOM intact. No visible discharge. Resp:No visualized signs of difficulty breathing or respiratory distress, speaking in full sentences. Respiratory effort normal.  Neuro: Awake. Alert. Able to follow commands. No facial asymmetry. Psych: Oriented x 3. No anxiety. Normal affect. DATA:   PSG 5/1/16- AHI 86.7, low spo2 74%, EKG NSR with PVCs  CPAP titration 5/23/16- Improved sleep related breathing with CPAP, rec CPAP 10 cm H2O, EKG with PVCs    CPAP compliance data:  Compliance download report from 6/24/17 to 7/23/17 showed patient is using machine 6:27 hrs/night with 90% compliance and AHI 0.6 within this time frame. 27/30days with greater than 4 hours of machine use. CPAP 10 cm H20    Compliance download report from 6/30/18 to 7/29/18 showed patient is using machine 6:28 hrs/night with 96% compliance and AHI 0.7 within this time frame. 29/30days with greater than 4 hours of machine use. CPAP 10 cm H20    Compliance download report from 7/13/19 to 8/11/19 reviewed today by me and showed patient is using machine 7:52 hrs/night with 100% compliance and AHI 0.7 within this time frame. 30/30days with greater than 4 hours of machine use. CPAP 10 cm H20    9/16/2020unable to obtain CPAP download report    CPAP compliance report from 8/24/20209/22/2020 on CPAP 10 cm H2O reviewed. Compliance is good 93%. AHI is good 0.4.     9/10/2021unable to obtain CPAP download report. Requested from DME    Assessment:       · Severe SAAD- CPAP 10 cm H2O-compliant per patient.   Symptoms appear well controlled  · Snoring- resolved on CPAP  · Witnessed apnea -resolved on CPAP  · Hypersomnia-improving on CPAP  · Seasonal allergies  · HTN      Plan:         · Order new CPAP 10 cm H2O   · Request CPAP download report from DME.  · Advised to use CPAP 7-8 hrs at night and during naps. · Discussed severity of SAAD and importance of CPAP use  · Send order for new supplies  · Replacement of mask, tubing, head straps every 3-6 months or sooner if damaged. · Patient instructed to contact DME company for any mask, tubing or machine trouble shooting if problems arise. · Sleep hygiene  · Avoid sedatives, alcohol and caffeinated drinks at bed time. · No driving motorized vehicles or operating heavy machinery while fatigue, drowsy or sleepy. · Weight loss is also recommended as a long-term intervention. · Complications of SAAD if not treated were discussed with patient patient to include systemic hypertension, pulmonary hypertension, cardiovascular morbidities, car accidents and all cause mortality. · Patient education  provided regarding sleep tips and CPAP cleaning recommendations     Follow up 1 year sooner if needed. Consent for telehealth visit was obtained and is noted in chart      THIS VISIT WAS COMPLETED VIRTUALLY USING DOXY. Barbie Angel is a 39 y.o. male being evaluated by a Virtual Visit (video visit) encounter to address concerns as mentioned above. A caregiver was present when appropriate. Due to this being a TeleHealth encounter (During [place] [address] public health emergency), evaluation of the following organ systems was limited: Vitals/Constitutional/EENT/Resp/CV/GI//MS/Neuro/Skin/Heme-Lymph-Imm. Pursuant to the emergency declaration under the [facility], [address] [facility] authority and the [employer] Act, this Virtual Visit was conducted with patient's (and/or legal guardian's) consent, to reduce the patient's risk of exposure to COVID-19 and provide necessary medical care.   The patient (and/or legal guardian) has also been advised to contact this office for worsening conditions or problems, and seek emergency medical treatment and/or call 911 if deemed necessary. Patient identification was verified at the start of the visit: Yes    Total time spent for this encounter: Not billed by time    Services were provided through a video synchronous discussion virtually to substitute for in-person clinic visit. Patient and provider were located at their individual homes. --RAJINDER Gregg CNP on 9/10/2021 at 11:27 AM    An electronic signature was used to authenticate this note.

## 2021-09-10 NOTE — TELEPHONE ENCOUNTER
Patient to take machine in to [email or web address] for a download.  In a week or two     Watch for report

## 2021-09-10 NOTE — TELEPHONE ENCOUNTER
Within this Telehealth Consent, the terms you and yours refer to the person using the Telehealth Service (Service), or in the case of a use of the Service by or on behalf of a minor, you and yours refer to and include (i) the parent or legal guardian who provides consent to the use of the Service by such minor or uses the Service on behalf of such minor, and (ii) the minor for whom consent is being provided or on whose behalf the Service is being utilized. When using Service, you will be consulting with your health care providers via the use of Telehealth.   Telehealth involves the delivery of healthcare services using electronic communications, information technology or other means between a healthcare provider and a patient who are not in the same physical location. Telehealth may be used for diagnosis, treatment, follow-up and/or patient education, and may include, but is not limited to, one or more of the following:    Electronic transmission of medical records, photo images, personal health information or other data between a patient and a healthcare provider    Interactions between a patient and healthcare provider via audio, video and/or data communications    Use of output data from medical devices, sound and video files    Anticipated Benefits   The use of Telehealth by your Provider(s) through the Service may have the following possible benefits:    Making it easier and more efficient for you to access medical care and treatment for the conditions treated by such Provider(s) utilizing the Service    Allowing you to obtain medical care and treatment by Provider(s) at times that are convenient for you    Enabling you to interact with Provider(s) without the necessity of an in-office appointment     Possible Risks   While the use of Telehealth can provide potential benefits for you, there are also potential risks associated with the use of Telehealth.  These risks include, but may not be limited to the following:    Your Provider(s) may not able to provide medical treatment for your particular condition and you may be required to seek alternative healthcare or emergency care services.  The electronic systems or other security protocols or safeguards used in the Service could fail, causing a breach of privacy of your medical or other information.  Given regulatory requirements in certain jurisdictions, your Provider(s) diagnosis and/or treatment options, especially pertaining to certain prescriptions, may be limited. Acceptance   1. You understand that Services will be provided via Telehealth. This process involves the use of HIPAA compliant and secure, real-time audio-visual interfacing with a qualified and appropriately trained provider located at [facility]. 2. You understand that, under no circumstances, will this session be recorded. 3. You understand that the Provider(s) at [facility] and other clinical participants will be party to the information obtained during the Telehealth session in accordance with best medical practices. 4. You understand that the information obtained during the Telehealth session will be used to help determine the most appropriate treatment options. 5. You understand that You have the right to revoke this consent at any point in time. 6. You understand that Telehealth is voluntary, and that continued treatment is not dependent upon consent. 7. You understand that, in the event of non-consent to Telehealth services and/or technical difficulties, you will obtain services as typically provided in the absence of Telehealth technology. 8. You understand that this consent will be kept in Your medical record. 9. No potential benefits from the use of Telehealth or specific results can be guaranteed. Your condition may not be cured or improved and, in some cases, may get worse.    10. There are limitations in the provision of medical care and treatment via Telehealth and the Service and you may not be able to receive diagnosis and/or treatment through the Service for every condition for which you seek diagnosis and/or treatment. 11. There are potential risks to the use of Telehealth, including but not limited to the risks described in this Telehealth Consent. 12. Your Provider(s) have discussed the use of Telehealth and the Service with you, including the benefits and risks of such and you have provided oral consent to your Provider(s) for the use of Telehealth and the Service. 15. You understand that it is your duty to provide your Provider(s) truthful, accurate and complete information, including all relevant information regarding care that you may have received or may be receiving from other healthcare providers outside of the Service. 14. You understand that each of your Provider(s) may determine in his or sole discretion that your condition is not suitable for diagnosis and/or treatment using the Service, and that you may need to seek medical care and treatment a specialist or other healthcare provider, outside of the Service. 15. You understand that you are fully responsible for payment for all services provided by Provider(s) or through use of the Service and that you may not be able to use third-party insurance. 16. You represent that (a) you have read this Telehealth Consent carefully, (b) you understand the risks and benefits of the Service and the use of Telehealth in the medical care and treatment provided to you by Provider(s) using the Service, and (c) you have the legal capacity and authority to provide this consent for yourself and/or the minor for which you are consenting under applicable federal and state laws, including laws relating to the age of [de-identified] and/or parental/guardian consent.    17. You give your informed consent to the use of Telehealth by Provider(s) using the Service under the terms described in the Terms of Service and this Telehealth Consent. The patient was read the following statement and has consented to the visit as of 9/10/21. The patient has been scheduled for their first telehealth visit on 9/10/212 with Tereso Carrasco.

## 2021-09-10 NOTE — PATIENT INSTRUCTIONS

## 2021-09-24 NOTE — TELEPHONE ENCOUNTER
Pt has not brought machine in for download yet. Patient called with message left for patient to call back to office to remind to take machine in for download.

## 2021-10-05 NOTE — TELEPHONE ENCOUNTER
Patient called with message left for patient to call back to office.      Office has called patient multiple times please advise

## 2021-10-11 NOTE — PROGRESS NOTES
CARDIOLOGY FOLLOW UP        Patient Name: Ritu Parkinson  Primary Care physician: Sabine Madison, APRN - CNP    Reason for Referral/Chief Complaint: Ritu Parkinson is a 39 y.o. patient who was referred originally for preventive cardiac visit. Followed for coronary artery disease. History of Present Illness:   Mr Abby Harris is a pleasant 39 y.o. male no prior medical history notable for hypertension, obstructive sleep apnea, adrenal tumor status post resection with benign pathology, obesity and hyperglycemia who presents for reevaluation for preventive cardiac care. Patient was last evaluated 7/7/21 at which time he reported that he had ekg with PCP for family history of heart disease. He reported losing 40-45 lbs in 6 months by cutting back on drinking and walking daily. He is also performing intermittent fasting 16 hrs per day. Since losing weight he is controlling his sugar readings better. He is performing a good workload and has no limitations by history. In the interim patient had a coronary calcium score performed with moderate plaque burden. Total score 242, 90th percentile for age and sex. Today, he notes that he has been doing fine from a cardiac standpoint. Patient denies chest pain, shortness of breath with activity, dizziness, palpitations or syncope. He has gained some weight back. He had gotten down to 256 rey. He is back up to 262 today. He attributes this to recent hospitalization for kidney stones following going through with 2 more procedures. He has been on the mend. He had been walking/jogging 3 miles daily without cardiac limitations. He is back to intermittent fasting. States he is fasting today and this is why his blood pressure is lower. States his blood pressure runs higher at home. Does take it with older wrist cuff. Denies paroxysmal nocturnal dyspnea, orthopnea, bendopnea, increasing lower extremity edema or weight gain. Compliant with CPAP.     The patient is compliant with medications. Cost of medications is affordable. No endorsed side effects. Former tobacco use. Quit using chewing tobacco and has replaced this with nicotine gum in the interim. Past Medical History:   has a past medical history of Difficult airway for intubation, Hypertension, Kidney stones, MRSA (methicillin resistant Staphylococcus aureus), and SAAD (obstructive sleep apnea). Surgical History:   has a past surgical history that includes other surgical history (12/13/12); other surgical history (09/25/2013); other surgical history (09/21/2020); Cystoscopy (Right, 9/21/2020); other surgical history (09/03/2021); and Bladder surgery (Right, 9/3/2021). Social History: 1 ppd x 10 years. Stop chewing tobacco. He drinks 1 drink daily. reports that he quit smoking about 8 years ago. His smoking use included cigarettes. He has a 10.00 pack-year smoking history. He has quit using smokeless tobacco.  His smokeless tobacco use included chew. He reports current alcohol use. He reports that he does not use drugs. He did use recreational drugs in the remote past    Family History:  Mom had myocardial infarction at age 64; dad has no heart history documented. Notes his paternal grandparents both suffered from coronary disease and myocardial infarction. Home Medications:  Were reviewed and are listed in nursing record and/or below  Prior to Admission medications    Medication Sig Start Date End Date Taking?  Authorizing Provider   aspirin 81 MG EC tablet Take 81 mg by mouth daily   Yes Historical Provider, MD   oxybutynin (DITROPAN XL) 10 MG extended release tablet Take 1 tablet by mouth daily as needed (stent pain, urgency frequency of urination) 9/3/21  Yes Bertrand Johnson MD   ketorolac (TORADOL) 10 MG tablet Take 1 tablet by mouth every 6 hours as needed for Pain 8/30/21 8/30/22 Yes RAJINDER Daly - CNP   ondansetron (ZOFRAN ODT) 4 MG disintegrating tablet Take 1 tablet by mouth every 8 hours as needed for Nausea 8/30/21  Yes RAJINDER Daly CNP   atorvastatin (LIPITOR) 20 MG tablet Take 1 tablet by mouth nightly 7/14/21 7/14/22 Yes Cynthia Santamaria MD   famotidine (PEPCID) 20 MG tablet Take 20 mg by mouth 2 times daily   Yes Historical Provider, MD   metFORMIN (GLUCOPHAGE) 1000 MG tablet Take 1,000 mg by mouth 2 times daily (with meals)  8/11/20  Yes Historical Provider, MD   lisinopril (PRINIVIL;ZESTRIL) 20 MG tablet Take 1 tablet by mouth daily  Patient taking differently: Take 10 mg by mouth daily  11/18/15  Yes Alie Madison MD   metoprolol (LOPRESSOR) 25 MG tablet Take 1 tablet by mouth 2 times daily. 11/20/14  Yes Barb Hernandez MD   tamsulosin [facility]) 0.4 MG capsule Take 1 capsule by mouth daily for 5 doses  Patient not taking: Reported on 9/10/2021 8/30/21 9/4/21  RAJINDER Daly CNP        CURRENT Medications:  No current facility-administered medications for this visit. Allergies:  Amoxicillin, Erythromycin, and Pcn [penicillins]     Review of Systems:   A 14 point review of symptoms completed. Pertinent positives identified in the HPI, all other review of symptoms negative as below. Objective:     Vitals:    10/13/21 1035   BP: 98/64   Pulse: 71   Temp: 98.3 °F (36.8 °C)   SpO2: 98%    Weight: 262 lb 12.8 oz (119.2 kg)       Wt Readings from Last 3 Encounters:   10/13/21 262 lb 12.8 oz (119.2 kg)   09/01/21 262 lb (118.8 kg)   07/07/21 278 lb (126.1 kg)       PHYSICAL EXAM:    General:  Alert, cooperative, no distress, appears stated age   Head:  Normocephalic, atraumatic   Eyes:  Conjunctiva/corneas clear, anicteric sclerae    Nose: Nares normal, no drainage or sinus tenderness   Throat: No abnormalities of the lips, oral mucosa or tongue. Neck: Trachea midline.  Neck supple with no lymphadenopathy, thyroid not enlarged, symmetric, no tenderness/mass/nodules, no Jugular venous pressure elevation    Lungs:   Clear to auscultation bilaterally, for: CHOLHDLRATIO      No results found for: CKTOTAL, CKMB, CKMBINDEX, TROPONINI        Cardiac Data:   EKG 21 personally reviewed, notable for normal sinus rhythm with RSR', otherwise normal    EKG 21 OSH personally reviewed, notable for normal sinus rhythm with isolated PVC    EK17  Marked sinus bradycardia with sinus arrhythmia 48bpm Abnormal ECG When compared with ECG of 02-DEC-2015 18:04, Vent. rate has decreased BY  75 BPM Incomplete right bundle branch block is no longer Present Confirmed by Nicolle Gruber MD, [address] ([id number]) on 2017 7:26:13 AM     CT Cardiac Calcium Score 2021  FINDINGS: LEFT MAIN: Zero (0). RIGHT CORONARY ARTERY: 134  LEFT ANTERIOR DESCENDIN  CIRCUMFLEX: 7  TOTAL AGATSTON CALCIUM SCORE: Zero (0). EXTRACARDIAC STRUCTURES: No evidence of mediastinal or hilar lymphadenopathy. No pericardial effusion. No cardiomegaly. No evidence of acute process in the lungs. No noncalcified nodules are noted in the lungs. Limited images of the upper abdomen are grossly unremarkable. Visualized osseous structures demonstrate age related degenerative changes without acute abnormality. Total calcium score of 242 is within the 90th percentile for the patient's   age of 38 y/o .             Impression and Plan:      1. Preventive cardiac care  2. Abnormal EKG with isolated PVC previously  3. Coronary artery disease/coronary artery calcification, 90th percentile by CAC score. No angina or clinical ASCVD  4. Obesity due to excess calories, BMI 37  5. Obstructive sleep apnea, compliant with CPAP  6. Family history of coronary artery disease  7.   Hypertension-controlled, low-normal today      Patient Active Problem List   Diagnosis    Attention deficit hyperactivity disorder (ADHD), predominantly inattentive type    HTN (hypertension)    Hyperglycemia    Family history of diabetes mellitus    Severe obstructive sleep apnea    Thrombocytosis    Obesity, Class III, BMI 40-49.9 (morbid obesity) ([facility] [address]    Hydronephrosis with renal calculous obstruction    Kidney stone    Intractable pain    Adrenal tumor    Class 3 severe obesity with body mass index (BMI) of 40.0 to 44.9 in adult [facility])    History of adrenal adenoma    H/O partial adrenalectomy (HCC)    Hypercalciuria    Abnormal EKG    Obesity (BMI 30-39. 9)       PLAN:  1. Medications reviewed. Continue aspirin and statin. We will adjust the latter as needed for LDL less than 70, triglycerides less than 150 goals. 2. Fasting labs: FLP, A1C, BMP when able  3. We encouraged modest weight loss through implementing appropriate dietary measures as well as initiation of a graded exercise program with the ultimate goal of 150 minutes of aerobic exercise weekly. He will restart his fasting program, we discussed portion control, and he will try to start exercising again regularly with aim for more weight loss. 4. Continue Lisinopril 10 mg daily. Advised to obtain new arm cuff and track blood pressures for 2 weeks and update our office with BP log. Adjust lisinopril as needed. Plan to follow up in 6 months. We will call with results of lab work as a return. This note was scribed in the presence of Joao Henry MD by Marleny Scott RN. The scribes documentation has been prepared under my direction and personally reviewed by me in its entirety. I confirm that the note above accurately reflects all work, treatment, procedures, and medical decision making performed by me. Zeinab Ferro MD, personally performed the services described in this documentation as scribed by Marleny Scott RN in my presence, and it is both accurate and complete to the best of our ability. I will address the patient's cardiac risk factors and adjusted pharmacologic treatment as needed. In addition, I have reinforced the need for patient directed risk factor modification. All questions and concerns were addressed to the patient/family. Alternatives to my treatment were discussed. Thank you for allowing us to participate in the care of Mindi Corrales. Please call me with any questions [phone].     Raymundo Rosa MD, [facility] [place]  Cardiovascular Disease  [facility]  [phone] [facility]  [phone] [address] [place] [address]  10/13/2021 11:10 AM

## 2021-10-13 ENCOUNTER — HOSPITAL ENCOUNTER (OUTPATIENT)
Age: 46
Discharge: HOME | End: 2021-10-13
Payer: COMMERCIAL

## 2021-10-13 ENCOUNTER — OFFICE VISIT (OUTPATIENT)
Dept: CARDIOLOGY CLINIC | Age: 46
End: 2021-10-13
Payer: COMMERCIAL

## 2021-10-13 VITALS
SYSTOLIC BLOOD PRESSURE: 98 MMHG | WEIGHT: 262.8 LBS | OXYGEN SATURATION: 98 % | BODY MASS INDEX: 37.62 KG/M2 | DIASTOLIC BLOOD PRESSURE: 64 MMHG | HEIGHT: 70 IN | HEART RATE: 71 BPM | TEMPERATURE: 98.3 F

## 2021-10-13 DIAGNOSIS — E66.01 OBESITY, CLASS III, BMI 40-49.9 (MORBID OBESITY) (HCC): ICD-10-CM

## 2021-10-13 DIAGNOSIS — E78.5 DYSLIPIDEMIA: ICD-10-CM

## 2021-10-13 DIAGNOSIS — I10 PRIMARY HYPERTENSION: ICD-10-CM

## 2021-10-13 DIAGNOSIS — G47.33 SEVERE OBSTRUCTIVE SLEEP APNEA: ICD-10-CM

## 2021-10-13 DIAGNOSIS — E78.5 DYSLIPIDEMIA: Primary | ICD-10-CM

## 2021-10-13 LAB
ANION GAP SERPL CALCULATED.3IONS-SCNC: 14 MMOL/L (ref 3–16)
BUN BLDV-MCNC: 15 MG/DL (ref 7–20)
CALCIUM SERPL-MCNC: 9.5 MG/DL (ref 8.3–10.6)
CHLORIDE BLD-SCNC: 104 MMOL/L (ref 99–110)
CHOLESTEROL, FASTING: 139 MG/DL (ref 0–199)
CO2: 22 MMOL/L (ref 21–32)
CREAT SERPL-MCNC: 0.7 MG/DL (ref 0.9–1.3)
GFR AFRICAN AMERICAN: >60
GFR NON-AFRICAN AMERICAN: >60
GLUCOSE BLD-MCNC: 89 MG/DL (ref 70–99)
HDLC SERPL-MCNC: 37 MG/DL (ref 40–60)
LDL CHOLESTEROL CALCULATED: 69 MG/DL
POTASSIUM SERPL-SCNC: 4.3 MMOL/L (ref 3.5–5.1)
SODIUM BLD-SCNC: 140 MMOL/L (ref 136–145)
TRIGLYCERIDE, FASTING: 167 MG/DL (ref 0–150)
VLDLC SERPL CALC-MCNC: 33 MG/DL

## 2021-10-13 PROCEDURE — 36415 COLL VENOUS BLD VENIPUNCTURE: CPT

## 2021-10-13 PROCEDURE — 80048 BASIC METABOLIC PNL TOTAL CA: CPT

## 2021-10-13 PROCEDURE — 83036 HEMOGLOBIN GLYCOSYLATED A1C: CPT

## 2021-10-13 PROCEDURE — 80061 LIPID PANEL: CPT

## 2021-10-13 PROCEDURE — 99214 OFFICE O/P EST MOD 30 MIN: CPT | Performed by: INTERNAL MEDICINE

## 2021-10-13 RX ORDER — LISINOPRIL 10 MG/1
10 TABLET ORAL DAILY
Qty: 30 TABLET | Refills: 0
Start: 2021-10-13 | End: 2021-10-19

## 2021-10-13 RX ORDER — ASPIRIN 81 MG/1
81 TABLET ORAL DAILY
COMMUNITY

## 2021-10-13 NOTE — PATIENT INSTRUCTIONS
1.  Medications reviewed. 2. Fasting labs: FLP, A1C, BMP  3. We encouraged modest weight loss through implementing appropriate dietary measures as well as initiation of a graded exercise program with the ultimate goal of 150 minutes of aerobic exercise weekly. 4. Continue Lisinopril 10 mg daily. Obtain new arm cuff. Call office in a couple of weeks with readings. Plan to follow up in 6 months.

## 2021-10-14 ENCOUNTER — TELEPHONE (OUTPATIENT)
Dept: CARDIOLOGY CLINIC | Age: 46
End: 2021-10-14

## 2021-10-14 LAB
ESTIMATED AVERAGE GLUCOSE: 119.8 MG/DL
HBA1C MFR BLD: 5.8 %

## 2021-10-14 NOTE — TELEPHONE ENCOUNTER
----- Message from Karen Cunha MD sent at 10/14/2021  7:57 AM EDT -----  Noted labs with normal electrolytes, renal function. LDL \"bad cholesterol\" is at goal <70 on statin - continue. TG's mildly elevated - will see how this responds to further diet/exercise/wt loss. Follow up as planned.

## 2021-10-14 NOTE — TELEPHONE ENCOUNTER
----- Message from Lisa Flores MD sent at 10/14/2021  8:47 AM EDT -----  Last part of labs came back, A1c normal at 5.8.  Better than prior 6.6. thx

## 2021-10-19 ENCOUNTER — TELEPHONE (OUTPATIENT)
Dept: CARDIOLOGY CLINIC | Age: 46
End: 2021-10-19

## 2021-10-19 RX ORDER — LISINOPRIL 5 MG/1
5 TABLET ORAL DAILY
Qty: 90 TABLET | Refills: 3 | Status: SHIPPED | OUTPATIENT
Start: 2021-10-19 | End: 2021-10-20

## 2021-10-19 NOTE — TELEPHONE ENCOUNTER
May cut tablet in half and take 5 mg daily. New prescription written to reflect this, sent to [facility] for when he runs out of his current medication. Return for follow-up as scheduled. Continue to track blood pressures with goal less than 130/80 average.     MARLENE

## 2021-10-19 NOTE — TELEPHONE ENCOUNTER
PT calling stating he is still having issue with dizziness. He did get a new BP cuff 2-3 days ago BP today is 123/68. Pt states diastolic has been running in the 60's consisently. States he wants to cut back on the Lisinopril. Last ov 10.13.21  Impression and Plan:      1. Preventive cardiac care  2. Abnormal EKG with isolated PVC previously  3. Coronary artery disease/coronary artery calcification, 90th percentile by CAC score. No angina or clinical ASCVD  4. Obesity due to excess calories, BMI 37  5. Obstructive sleep apnea, compliant with CPAP  6. Family history of coronary artery disease  7. Hypertension-controlled, low-normal today            Patient Active Problem List   Diagnosis    Attention deficit hyperactivity disorder (ADHD), predominantly inattentive type    HTN (hypertension)    Hyperglycemia    Family history of diabetes mellitus    Severe obstructive sleep apnea    Thrombocytosis    Obesity, Class III, BMI 40-49.9 (morbid obesity) ([facility] [address]    Hydronephrosis with renal calculous obstruction    Kidney stone    Intractable pain    Adrenal tumor    Class 3 severe obesity with body mass index (BMI) of 40.0 to 44.9 in adult [facility])    History of adrenal adenoma    H/O partial adrenalectomy (HCC)    Hypercalciuria    Abnormal EKG    Obesity (BMI 30-39. 9)       PLAN:  1. Medications reviewed. Continue aspirin and statin. We will adjust the latter as needed for LDL less than 70, triglycerides less than 150 goals. 2. Fasting labs: FLP, A1C, BMP when able  3. We encouraged modest weight loss through implementing appropriate dietary measures as well as initiation of a graded exercise program with the ultimate goal of 150 minutes of aerobic exercise weekly. He will restart his fasting program, we discussed portion control, and he will try to start exercising again regularly with aim for more weight loss. 4. Continue Lisinopril 10 mg daily.  Advised to obtain new arm cuff and track blood pressures for 2 weeks and update our office with BP log. Adjust lisinopril as needed.     Plan to follow up in 6 months. We will call with results of lab work as a return.

## 2021-10-20 RX ORDER — LISINOPRIL 5 MG/1
5 TABLET ORAL DAILY
Qty: 90 TABLET | Refills: 2 | Status: SHIPPED | OUTPATIENT
Start: 2021-10-20 | End: 2022-08-11

## 2021-10-20 NOTE — TELEPHONE ENCOUNTER
Spoke with pt, he agreed to lisinopril 5mg, he did say he switched pharmacies. Medication pended for sign off at correct pharmacy.  TY

## 2021-12-03 ENCOUNTER — TELEPHONE (OUTPATIENT)
Dept: PULMONOLOGY | Age: 46
End: 2021-12-03

## 2021-12-03 NOTE — TELEPHONE ENCOUNTER
Patient was [facility] for 31-90d appt for 12/3/21 was cancelled he has not been setup with new cpap. He will contact insurance for new DME.

## 2021-12-10 NOTE — TELEPHONE ENCOUNTER
Pt called stating that he took machine to [facility] 12/6/21 for compliance download. Pt wanted Alba Ramsay CNP to review compliance on current machine before proceeding with new . No compliance is available in online portal.  Called [facility], spoke with Mary whom is checking into compliance and will call office back.

## 2021-12-16 NOTE — TELEPHONE ENCOUNTER
See below results of CPAP download. Orders for new CPAP were sent in September. If he is getting a new machine he will need 31-90-day follow-up. If not can schedule I year follow-up.

## 2021-12-27 NOTE — TELEPHONE ENCOUNTER
Pt returned call stating that he doesn't want to stick with old DME. Pt called insurance and would like to have orders sent to [facility] for new machine.

## 2021-12-27 NOTE — TELEPHONE ENCOUNTER
Patient called with message left for patient to call back to office.      Office has tried to call patient multiple times please advise

## 2022-01-25 NOTE — TELEPHONE ENCOUNTER
Spoke to patient, he has not rec'd machine. I called and spoke to meliton at [facility] and she said that they do have the orders but they are waiting on equipment.      Will continue to follow up and watch for patients set up before scheduling 31-90d

## 2022-04-08 ENCOUNTER — TELEMEDICINE (OUTPATIENT)
Dept: PULMONOLOGY | Age: 47
End: 2022-04-08
Payer: COMMERCIAL

## 2022-04-08 DIAGNOSIS — G47.33 SEVERE OBSTRUCTIVE SLEEP APNEA: Primary | ICD-10-CM

## 2022-04-08 DIAGNOSIS — E66.9 OBESITY (BMI 30-39.9): ICD-10-CM

## 2022-04-08 DIAGNOSIS — Z71.89 CPAP USE COUNSELING: ICD-10-CM

## 2022-04-08 PROCEDURE — 99213 OFFICE O/P EST LOW 20 MIN: CPT | Performed by: NURSE PRACTITIONER

## 2022-04-08 ASSESSMENT — SLEEP AND FATIGUE QUESTIONNAIRES
HOW LIKELY ARE YOU TO NOD OFF OR FALL ASLEEP WHILE SITTING QUIETLY AFTER LUNCH WITHOUT ALCOHOL: 0
HOW LIKELY ARE YOU TO NOD OFF OR FALL ASLEEP WHILE SITTING AND READING: 0
HOW LIKELY ARE YOU TO NOD OFF OR FALL ASLEEP WHEN YOU ARE A PASSENGER IN A CAR FOR AN HOUR WITHOUT A BREAK: 0
ESS TOTAL SCORE: 2
HOW LIKELY ARE YOU TO NOD OFF OR FALL ASLEEP WHILE SITTING INACTIVE IN A PUBLIC PLACE: 0
HOW LIKELY ARE YOU TO NOD OFF OR FALL ASLEEP IN A CAR, WHILE STOPPED FOR A FEW MINUTES IN TRAFFIC: 0
HOW LIKELY ARE YOU TO NOD OFF OR FALL ASLEEP WHILE SITTING AND TALKING TO SOMEONE: 0
HOW LIKELY ARE YOU TO NOD OFF OR FALL ASLEEP WHILE WATCHING TV: 0
HOW LIKELY ARE YOU TO NOD OFF OR FALL ASLEEP WHILE LYING DOWN TO REST IN THE AFTERNOON WHEN CIRCUMSTANCES PERMIT: 2

## 2022-04-08 NOTE — PATIENT INSTRUCTIONS

## 2022-04-08 NOTE — PROGRESS NOTES
CHIEF COMPLAINT: Sleep apnea follow up  Consulting provider: Dr. Juana Aguilera is a 42 y.o. male for televideo appointment via video and audio doxy. me virtual visit for SAAD follow up. States he is doing well with new CPAP. Patient is using CPAP   7-9hrs/night. Using humidifier. No snoring on CPAP. The pressure is well tolerated. The mask is comfortable-F30i. Some mask leak. No significant daytime sleepiness. No nodding off when driving. No dry nose or throat. No fatigue. Bedtime is 8-9 pm and rise time is 4 am. Sleep onset is 15-40 minutes. Wakes up 1 times at night total. 1 nocturia. It takes few minutes to fall back a sleep. Occasional naps during the day. No headache in am. No weight gain. 0-1 caffienated beverages during the day. Rare alcohol. ESS is 2.       Past Medical History:   Diagnosis Date    Difficult airway for intubation 09/21/2020    good view glidescope, difficult to pass tube    Hypertension     Kidney stones     MRSA (methicillin resistant Staphylococcus aureus) 12/10/12    SAAD (obstructive sleep apnea)        Past Surgical History:        Procedure Laterality Date    BLADDER SURGERY Right 9/3/2021    CYSTOSCOPY, RIGHT URETEROSCOPY WITH HOLMIUM LASER LITHOTRIPSY,  RETROGRADE PYELOGRAM, URETERAL STENT INSERTION performed by Coral Pham MD at [facility] 9/21/2020    CYSTOSCOPY WITH STONE MANIPULIATION,RIGHT STENT PLACEMENT performed by Nan Dobbs MD at [place] [address]  12/13/12    Wide Excision of Nonhealing Cysts Right Axilla and Bilateral Upper Inner Thighs    OTHER SURGICAL HISTORY  09/25/2013    cystoscopy, right retrograde, ureteroscopy, Holmium Laser lithotripsy, stone extraction    OTHER SURGICAL HISTORY  09/21/2020    cystoscopy with stone manipulation right stemnt placement    OTHER SURGICAL HISTORY  09/03/2021    CYSTOSCOPY, RIGHT URETEROSCOPY WITH HOLMIUM LASER LITHOTRIPSY, POSSIBLE STENT INSERTION - Right       Allergies:  is allergic to amoxicillin, erythromycin, and pcn [penicillins]. Social History:    TOBACCO:   reports that he quit smoking about 9 years ago. His smoking use included cigarettes. He has a 10.00 pack-year smoking history. He has quit using smokeless tobacco.  His smokeless tobacco use included chew. ETOH:   reports current alcohol use. Family History:       Problem Relation Age of Onset    Diabetes Mother     Asthma Father     COPD Father        Current Medications:    Current Outpatient Medications:     lisinopril (PRINIVIL;ZESTRIL) 5 MG tablet, Take 1 tablet by mouth daily, Disp: 90 tablet, Rfl: 2    aspirin 81 MG EC tablet, Take 81 mg by mouth daily, Disp: , Rfl:     atorvastatin (LIPITOR) 20 MG tablet, Take 1 tablet by mouth nightly, Disp: 90 tablet, Rfl: 3    famotidine (PEPCID) 20 MG tablet, Take 20 mg by mouth 2 times daily, Disp: , Rfl:     metFORMIN (GLUCOPHAGE) 1000 MG tablet, Take 1,000 mg by mouth 2 times daily (with meals) , Disp: , Rfl:     metoprolol (LOPRESSOR) 25 MG tablet, Take 1 tablet by mouth 2 times daily. , Disp: 180 tablet, Rfl: 1    oxybutynin (DITROPAN XL) 10 MG extended release tablet, Take 1 tablet by mouth daily as needed (stent pain, urgency frequency of urination) (Patient not taking: Reported on 4/8/2022), Disp: 30 tablet, Rfl: 3    ketorolac (TORADOL) 10 MG tablet, Take 1 tablet by mouth every 6 hours as needed for Pain (Patient not taking: Reported on 4/8/2022), Disp: 20 tablet, Rfl: 0    tamsulosin (FLOMAX) 0.4 MG capsule, Take 1 capsule by mouth daily for 5 doses (Patient not taking: Reported on 9/10/2021), Disp: 5 capsule, Rfl: 0    ondansetron (ZOFRAN ODT) 4 MG disintegrating tablet, Take 1 tablet by mouth every 8 hours as needed for Nausea (Patient not taking: Reported on 4/8/2022), Disp: 20 tablet, Rfl: 0      Objective:   PHYSICAL EXAM:    There were no vitals taken for this visit.' on RA  Exam:  Gen: No acute distress, does not appear to be in pain. Appears well developed and nourished. HENT: Head is normocephalic and atraumatic. Normal appearing nose. External Ears normal.   Neck: No visualized mass. Trachea is midline   Eyes: EOM intact. No visible discharge. Resp:No visualized signs of difficulty breathing or respiratory distress, speaking in full sentences. Respiratory effort normal.  Neuro: Awake. Alert. Able to follow commands. No facial asymmetry. Psych: Oriented x 3. No anxiety. Normal affect. DATA:   PSG 5/1/16- AHI 86.7, low spo2 74%, EKG NSR with PVCs  CPAP titration 5/23/16- Improved sleep related breathing with CPAP, rec CPAP 10 cm H2O, EKG with PVCs    CPAP compliance data:  Compliance download report from 6/24/17 to 7/23/17 showed patient is using machine 6:27 hrs/night with 90% compliance and AHI 0.6 within this time frame. 27/30days with greater than 4 hours of machine use. CPAP 10 cm H20    Compliance download report from 6/30/18 to 7/29/18 showed patient is using machine 6:28 hrs/night with 96% compliance and AHI 0.7 within this time frame. 29/30days with greater than 4 hours of machine use. CPAP 10 cm H20    Compliance download report from 7/13/19 to 8/11/19 reviewed today by me and showed patient is using machine 7:52 hrs/night with 100% compliance and AHI 0.7 within this time frame. 30/30days with greater than 4 hours of machine use. CPAP 10 cm H20    9/16/2020unable to obtain CPAP download report    CPAP compliance report from 8/24/20209/22/2020 on CPAP 10 cm H2O reviewed. Compliance is good 93%. AHI is good 0.4.     9/10/2021unable to obtain CPAP download report. Requested from [facility]    New CPAP:  Compliance download report from 3/4/22 to 4/6/22 reviewed today by me and showed patient is using machine 8:16 hrs/night with 100% compliance and AHI 1.5 within this time frame. 30/30days with greater than 4 hours of machine use.   CPAP 10 cm H20     Assessment:       · Severe SAAD- CPAP 10 cm electronic signature was used to authenticate this note.

## 2022-07-08 ENCOUNTER — TELEPHONE (OUTPATIENT)
Dept: CARDIOLOGY CLINIC | Age: 47
End: 2022-07-08

## 2022-07-08 RX ORDER — ATORVASTATIN CALCIUM 20 MG/1
20 TABLET, FILM COATED ORAL NIGHTLY
Qty: 60 TABLET | Refills: 0 | Status: SHIPPED | OUTPATIENT
Start: 2022-07-08 | End: 2022-09-21 | Stop reason: SDUPTHER

## 2022-07-08 NOTE — TELEPHONE ENCOUNTER
LOV 10/13/2021  NOV was to be seen in six months.   Left patient message to schedule follow up    Lipids 10/13/2021

## 2022-07-08 NOTE — TELEPHONE ENCOUNTER
I tried to schedule patient for follow up and left message to call us. Please reach out if patient does not call us back.  Thanks

## 2022-07-08 NOTE — TELEPHONE ENCOUNTER
[address] [facility] desk has received duplicate messages regarding pt needing an ov. Signing this encounter. Will include this message in the other refill encounter.

## 2022-07-08 NOTE — TELEPHONE ENCOUNTER
Joe Iglesias RN at 7/8/2022  8:52 AM    Status: Signed      I tried to schedule patient for follow up and left message to call us. Please reach out if patient does not call us back.  Thanks

## 2022-08-04 NOTE — PROGRESS NOTES
CARDIOLOGY FOLLOW UP        Patient Name: Robbin Fernandez  Primary Care physician: Boubacar Thrasher, APRN - CNP    Reason for Referral/Chief Complaint: Robbin Fernandez is a 42 y.o. patient who was referred originally for preventive cardiac visit. Followed for coronary artery disease. History of Present Illness:   Mr Brigid Ramirez is a pleasant 42 y.o. male no prior medical history notable for hypertension, obstructive sleep apnea, adrenal tumor status post resection with benign pathology, obesity and hyperglycemia who presents for reevaluation for preventive cardiac care. Patient was  evaluated 7/7/21 at which time he reported that he had ekg with PCP for family history of heart disease. He reported losing 40-45 lbs in 6 months by cutting back on drinking and walking daily. He is also performing intermittent fasting 16 hrs per day. Since losing weight he is controlling his sugar readings better. In the interim patient had a coronary calcium score performed with moderate plaque burden. Total score 242, 90th percentile for age and sex. LOV 10/13/2021 , he noted that he has been doing fine from a cardiac standpoint. He has gained some weight back. He had gotten down to 256 rey. He is back up to 262 today. He attributes this to recent hospitalization for kidney stones following going through with 2 more procedures. He has been on the mend. He had been walking/jogging 3 miles daily without cardiac limitations. He is back to intermittent fasting. States he is fasting today and this is why his blood pressure is lower. States his blood pressure runs higher at home. Does take it with older wrist cuff. In the [facility] phone call on 10/19/2021 ok to cut lisinopril to 5 mg daily. Today, ***      Former tobacco use. Quit using chewing tobacco and has replaced this with nicotine gum in the interim.     Past Medical History:   has a past medical history of Difficult airway for intubation, Hypertension, Kidney stones, MRSA (methicillin resistant Staphylococcus aureus), and SAAD (obstructive sleep apnea). Surgical History:   has a past surgical history that includes other surgical history (12/13/12); other surgical history (09/25/2013); other surgical history (09/21/2020); Cystoscopy (Right, 9/21/2020); other surgical history (09/03/2021); and Bladder surgery (Right, 9/3/2021). Social History: 1 ppd x 10 years. Stop chewing tobacco. He drinks 1 drink daily. reports that he quit smoking about 9 years ago. His smoking use included cigarettes. He has a 10.00 pack-year smoking history. He has quit using smokeless tobacco.  His smokeless tobacco use included chew. He reports current alcohol use. He reports that he does not use drugs. He did use recreational drugs in the remote past    Family History:  Mom had myocardial infarction at age 64; dad has no heart history documented. Notes his paternal grandparents both suffered from coronary disease and myocardial infarction. Home Medications:  Were reviewed and are listed in nursing record and/or below  Prior to Admission medications    Medication Sig Start Date End Date Taking?  Authorizing Provider   atorvastatin (LIPITOR) 20 MG tablet Take 1 tablet by mouth at bedtime 7/8/22 9/6/22  Lorraine Mejias MD   lisinopril (PRINIVIL;ZESTRIL) 5 MG tablet Take 1 tablet by mouth daily 10/20/21   Lorraine Mejias MD   aspirin 81 MG EC tablet Take 81 mg by mouth daily    Historical Provider, MD   oxybutynin (DITROPAN XL) 10 MG extended release tablet Take 1 tablet by mouth daily as needed (stent pain, urgency frequency of urination)  Patient not taking: Reported on 4/8/2022 9/3/21   Janis Matson MD   ketorolac (TORADOL) 10 MG tablet Take 1 tablet by mouth every 6 hours as needed for Pain  Patient not taking: Reported on 4/8/2022 8/30/21 8/30/22  Natalia Ragland APRN - CNP   tamsulosin (FLOMAX) 0.4 MG capsule Take 1 capsule by mouth daily for 5 doses  Patient not taking: Reported on 9/10/2021 8/30/21 9/4/21  RAJINDER Daly CNP   ondansetron (ZOFRAN ODT) 4 MG disintegrating tablet Take 1 tablet by mouth every 8 hours as needed for Nausea  Patient not taking: Reported on 4/8/2022 8/30/21   RAJINDER Daly CNP   famotidine (PEPCID) 20 MG tablet Take 20 mg by mouth 2 times daily    Historical Provider, MD   metFORMIN (GLUCOPHAGE) 1000 MG tablet Take 1,000 mg by mouth 2 times daily (with meals)  8/11/20   Historical Provider, MD   metoprolol (LOPRESSOR) 25 MG tablet Take 1 tablet by mouth 2 times daily. 11/20/14   Cheri Arteaga MD        CURRENT Medications:  No current facility-administered medications for this visit. Allergies:  Amoxicillin, Erythromycin, and Pcn [penicillins]     Review of Systems:   A 14 point review of symptoms completed. Pertinent positives identified in the HPI, all other review of symptoms negative as below. Objective: There were no vitals filed for this visit. Wt Readings from Last 3 Encounters:   10/13/21 262 lb 12.8 oz (119.2 kg)   09/01/21 262 lb (118.8 kg)   07/07/21 278 lb (126.1 kg)       PHYSICAL EXAM:    General:  Alert, cooperative, no distress, appears stated age   Head:  Normocephalic, atraumatic   Eyes:  Conjunctiva/corneas clear, anicteric sclerae    Nose: Nares normal, no drainage or sinus tenderness   Throat: No abnormalities of the lips, oral mucosa or tongue. Neck: Trachea midline. Neck supple with no lymphadenopathy, thyroid not enlarged, symmetric, no tenderness/mass/nodules, no Jugular venous pressure elevation    Lungs:   Clear to auscultation bilaterally, no wheezes, no rales, no respiratory distress   Chest Wall:  No deformity or tenderness to palpation   Heart:  Regular rate and rhythm, normal S1, normal S2, no murmur, no rub, no S3/S4, PMI non-displaced. No precordial heave. Abdomen:    Obesity, soft, non-tender, with normoactive bowel sounds.  No masses, no hepatosplenomegaly Extremities: No cyanosis, clubbing or pitting edema. Vascular: 2+ radial,  dorsalis pedis and posterior tibial pulses bilaterally. Brisk carotid upstrokes without carotid bruit. Skin: Skin color, texture, turgor are normal with no rashes or ulceration. Pysch: Euthymic mood, appropriate affect   Neurologic: Oriented to person, place and time. No slurred speech or facial asymmetry. No motor or sensory deficits on gross examination.          Labs:   CBC:   Lab Results   Component Value Date/Time    WBC 12.6 08/30/2021 02:37 PM    RBC 4.51 08/30/2021 02:37 PM    RBC 4.90 08/17/2016 11:43 AM    HGB 13.4 08/30/2021 02:37 PM    HCT 41.2 08/30/2021 02:37 PM    MCV 91.4 08/30/2021 02:37 PM    RDW 13.2 08/30/2021 02:37 PM     08/30/2021 02:37 PM     CMP:  Lab Results   Component Value Date/Time     10/13/2021 12:06 PM    K 4.3 10/13/2021 12:06 PM    K 4.6 08/30/2021 02:37 PM     10/13/2021 12:06 PM    CO2 22 10/13/2021 12:06 PM    BUN 15 10/13/2021 12:06 PM    CREATININE 0.7 10/13/2021 12:06 PM    GFRAA >60 10/13/2021 12:06 PM    GFRAA >60 01/23/2013 01:50 PM    AGRATIO 1.9 08/30/2021 02:37 PM    LABGLOM >60 10/13/2021 12:06 PM    GLUCOSE 89 10/13/2021 12:06 PM    PROT 7.0 08/30/2021 02:37 PM    PROT 7.6 01/23/2013 01:50 PM    CALCIUM 9.5 10/13/2021 12:06 PM    BILITOT <0.2 08/30/2021 02:37 PM    ALKPHOS 101 08/30/2021 02:37 PM    AST 11 08/30/2021 02:37 PM    ALT 18 08/30/2021 02:37 PM     PT/INR:  No results found for: PTINR  HgBA1c:  Lab Results   Component Value Date    LABA1C 5.8 10/13/2021     Lab Results   Component Value Date    CHOL 181 07/09/2021     Lab Results   Component Value Date    TRIG 235 (H) 07/09/2021     Lab Results   Component Value Date    HDL 37 (L) 10/13/2021    HDL 32 (L) 07/09/2021     Lab Results   Component Value Date    LDLCALC 69 10/13/2021    LDLCALC 102 (H) 07/09/2021     Lab Results   Component Value Date    LABVLDL 33 10/13/2021    LABVLDL 47 07/09/2021     No results found for: CHOLHDLRATIO      No results found for: CKTOTAL, CKMB, CKMBINDEX, TROPONINI        Cardiac Data:   EKG 21 personally reviewed, notable for normal sinus rhythm with RSR', otherwise normal    EKG 21 OSH personally reviewed, notable for normal sinus rhythm with isolated PVC    EK17  Marked sinus bradycardia with sinus arrhythmia 48bpm Abnormal ECG When compared with ECG of 02-DEC-2015 18:04, Vent. rate has decreased BY  75 BPM Incomplete right bundle branch block is no longer Present Confirmed by Camacho Chase MD, [address] () on 2017 7:26:13 AM     CT Cardiac Calcium Score 2021  FINDINGS: LEFT MAIN: Zero (0). RIGHT CORONARY ARTERY: 134  LEFT ANTERIOR DESCENDIN  CIRCUMFLEX: 7  TOTAL AGATSTON CALCIUM SCORE: Zero (0). EXTRACARDIAC STRUCTURES: No evidence of mediastinal or hilar lymphadenopathy. No pericardial effusion. No cardiomegaly. No evidence of acute process in the lungs. No noncalcified nodules are noted in the lungs. Limited images of the upper abdomen are grossly unremarkable. Visualized osseous structures demonstrate age related degenerative changes without acute abnormality. Total calcium score of 242 is within the 90th percentile for the patient's   age of 38 y/o . Impression and Plan:      1. Preventive cardiac care  2. Abnormal EKG with isolated PVC previously  3. Coronary artery disease/coronary artery calcification, 90th percentile by CAC score. No angina or clinical ASCVD  4. Obesity due to excess calories, BMI 37  5. Obstructive sleep apnea, compliant with CPAP  6. Family history of coronary artery disease  7.   Hypertension-controlled, low-normal today      Patient Active Problem List   Diagnosis    Attention deficit hyperactivity disorder (ADHD), predominantly inattentive type    HTN (hypertension)    Hyperglycemia    Family history of diabetes mellitus    Severe obstructive sleep apnea    Thrombocytosis    Obesity, Class III, BMI 40-49.9 (morbid obesity) ([facility] [address]    Hydronephrosis with renal calculous obstruction    Kidney stone    Intractable pain    Adrenal tumor    Class 3 severe obesity with body mass index (BMI) of 40.0 to 44.9 in adult [facility])    History of adrenal adenoma    H/O partial adrenalectomy (HCC)    Hypercalciuria    Abnormal EKG    Obesity (BMI 30-39. 9)       PLAN:  1. Medications reviewed. Continue aspirin and statin. We will adjust the latter as needed for LDL less than 70, triglycerides less than 150 goals. 2. Fasting labs: FLP, A1C, BMP when able  3. We encouraged modest weight loss through implementing appropriate dietary measures as well as initiation of a graded exercise program with the ultimate goal of 150 minutes of aerobic exercise weekly. He will restart his fasting program, we discussed portion control, and he will try to start exercising again regularly with aim for more weight loss. 4. Continue Lisinopril 10 mg daily. Advised to obtain new arm cuff and track blood pressures for 2 weeks and update our office with BP log. Adjust lisinopril as needed. Plan to follow up in     ***    ***    I will address the patient's cardiac risk factors and adjusted pharmacologic treatment as needed. In addition, I have reinforced the need for patient directed risk factor modification. All questions and concerns were addressed to the patient/family. Alternatives to my treatment were discussed. Thank you for allowing us to participate in the care of Elías Powers. Please call me with any questions [phone].     Delaney Faulkner MD, [facility] [place]  Cardiovascular Disease  [address]  [phone] [employer]  [phone] [address] [place] [address]  8/4/2022 8:46 AM

## 2022-08-11 ENCOUNTER — OFFICE VISIT (OUTPATIENT)
Dept: CARDIOLOGY CLINIC | Age: 47
End: 2022-08-11
Payer: COMMERCIAL

## 2022-08-11 VITALS
BODY MASS INDEX: 43.31 KG/M2 | HEART RATE: 87 BPM | SYSTOLIC BLOOD PRESSURE: 120 MMHG | OXYGEN SATURATION: 98 % | WEIGHT: 302.5 LBS | HEIGHT: 70 IN | DIASTOLIC BLOOD PRESSURE: 78 MMHG

## 2022-08-11 DIAGNOSIS — E66.01 CLASS 3 SEVERE OBESITY IN ADULT, UNSPECIFIED BMI, UNSPECIFIED OBESITY TYPE, UNSPECIFIED WHETHER SERIOUS COMORBIDITY PRESENT (HCC): Primary | ICD-10-CM

## 2022-08-11 DIAGNOSIS — I25.10 CORONARY ARTERY DISEASE WITHOUT ANGINA PECTORIS, UNSPECIFIED VESSEL OR LESION TYPE, UNSPECIFIED WHETHER NATIVE OR TRANSPLANTED HEART: ICD-10-CM

## 2022-08-11 DIAGNOSIS — D49.7 ADRENAL TUMOR: ICD-10-CM

## 2022-08-11 DIAGNOSIS — I10 PRIMARY HYPERTENSION: ICD-10-CM

## 2022-08-11 PROCEDURE — 99214 OFFICE O/P EST MOD 30 MIN: CPT | Performed by: INTERNAL MEDICINE

## 2022-08-11 RX ORDER — LISINOPRIL 10 MG/1
10 TABLET ORAL DAILY
Qty: 90 TABLET | Refills: 3 | Status: SHIPPED | OUTPATIENT
Start: 2022-08-11

## 2022-08-11 NOTE — PROGRESS NOTES
CARDIOLOGY FOLLOW UP        Patient Name: Padmaja Suarez  Primary Care physician: Jovan Holland, APRN - CNP    Reason for Referral/Chief Complaint: Padmaja Suarez is a 42 y.o. patient who was referred originally for preventive cardiac visit. Followed for coronary artery disease. History of Present Illness:   Mr Ciarra Odonnell is a pleasant 42 y.o. male no prior medical history notable for hypertension, obstructive sleep apnea, obesity, adrenal tumor status post resection with benign pathology, and diabetes mellitus who presents for reevaluation for history of coronary artery disease by CT. At [address] 7/7/21 at which time he reported that he had ekg with PCP for family history of heart disease. He reported losing 40-45 lbs in 6 months by cutting back on drinking and walking daily. He is also performing intermittent fasting 16 hrs per day. In the interim patient had a coronary calcium score performed with moderate plaque burden. Total score 242, 90th percentile for age and sex. LOV 10/13/2021 , he noted that he has been doing fine from a cardiac standpoint. He has gained some weight back. He had gotten down to 256 rey. He is back up to 262 today. He attributes this to recent hospitalization for kidney stones following going through with 2 more procedures. He has been on the mend. In the interm,  10/19/2021 his lisinopril reduced to 5 mg daily due to lower blood pressure in the setting of recent weight loss. 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 Is also continue to struggle with recurrent kidney stones. He has unfortunately gained a significant amount of weight back. Weight 302 pounds today. Roughly 40 pounds increase from previous weight. He is not drinking soft drinks again. But is not exercising like he once was. No longer performing intermittent fasting. He is taking his medications as prescribed.   He has started back at 10 mg lisinopril as noted blood pressure was rising in the setting of the weight gain. Patient denies angina with activity. Denies limiting dyspnea with exertion. No significant increase in swelling, no orthopnea or paroxysmal nocturnal dyspnea . Former tobacco use. Quit using chewing tobacco and has replaced this with nicotine gum previously. Admits to smoking cigars more recently however. Past Medical History:   has a past medical history of Difficult airway for intubation, Hypertension, Kidney stones, MRSA (methicillin resistant Staphylococcus aureus), and SAAD (obstructive sleep apnea). Surgical History:   has a past surgical history that includes other surgical history (12/13/12); other surgical history (09/25/2013); other surgical history (09/21/2020); Cystoscopy (Right, 9/21/2020); other surgical history (09/03/2021); and Bladder surgery (Right, 9/3/2021). Social History: 1 ppd x 10 years. Stop chewing tobacco. He drinks 1 drink daily. reports that he has been smoking cigarettes and cigars. He has a 10.00 pack-year smoking history. He has quit using smokeless tobacco.  His smokeless tobacco use included chew. He reports current alcohol use. He reports that he does not use drugs. He did use recreational drugs in the remote past    Family History:  Mom had myocardial infarction at age 64; dad has no heart history documented. Notes his paternal grandparents both suffered from coronary disease and myocardial infarction. Home Medications:  Were reviewed and are listed in nursing record and/or below  Prior to Admission medications    Medication Sig Start Date End Date Taking?  Authorizing Provider   atorvastatin (LIPITOR) 20 MG tablet Take 1 tablet by mouth at bedtime 7/8/22 9/6/22 Yes Lisa Flores MD   lisinopril (PRINIVIL;ZESTRIL) 5 MG tablet Take 1 tablet by mouth daily 10/20/21  Yes Lisa Flores MD   aspirin 81 MG EC tablet Take 81 mg by mouth daily   Yes Historical Provider, MD   famotidine (PEPCID) 20 MG tablet Take 20 mg by mouth 2 times daily   Yes Historical Provider, MD   metFORMIN (GLUCOPHAGE) 1000 MG tablet Take 1,000 mg by mouth 2 times daily (with meals)  8/11/20  Yes Historical Provider, MD   metoprolol (LOPRESSOR) 25 MG tablet Take 1 tablet by mouth 2 times daily. 11/20/14  Yes Flaco Ayala MD   oxybutynin (DITROPAN XL) 10 MG extended release tablet Take 1 tablet by mouth daily as needed (stent pain, urgency frequency of urination)  Patient not taking: No sig reported 9/3/21   Baljinder Hebert MD   tamsulosin (FLOMAX) 0.4 MG capsule Take 1 capsule by mouth daily for 5 doses  Patient not taking: No sig reported 8/30/21 9/4/21  RAJINDER Daly CNP   ondansetron (ZOFRAN ODT) 4 MG disintegrating tablet Take 1 tablet by mouth every 8 hours as needed for Nausea  Patient not taking: No sig reported 8/30/21   RAJINDER Daly CNP        CURRENT Medications:  No current facility-administered medications for this visit. Allergies:  Amoxicillin, Erythromycin, and Pcn [penicillins]     Review of Systems:   A 14 point review of symptoms completed. Pertinent positives identified in the HPI, all other review of symptoms negative as below. Objective:     Vitals:    08/11/22 1031   BP: 120/78   Pulse: 87   SpO2: 98%      Weight: (!) 302 lb 8 oz (137.2 kg)       Wt Readings from Last 3 Encounters:   08/11/22 (!) 302 lb 8 oz (137.2 kg)   10/13/21 262 lb 12.8 oz (119.2 kg)   09/01/21 262 lb (118.8 kg)       PHYSICAL EXAM:    General:  Alert, cooperative, no distress, appears stated age   Head:  Normocephalic, atraumatic   Eyes:  Conjunctiva/corneas clear, anicteric sclerae    Nose: Nares normal, no drainage or sinus tenderness   Throat: No abnormalities of the lips, oral mucosa or tongue. Neck: Trachea midline.  Neck supple with no lymphadenopathy, thyroid not enlarged, symmetric, no tenderness/mass/nodules, no Jugular venous pressure elevation Lungs:   Clear to auscultation bilaterally, no wheezes, no rales, no respiratory distress   Chest Wall:  No deformity or tenderness to palpation   Heart:  Regular rate and rhythm, normal S1, normal S2, no murmur, no rub, no S3/S4, PMI non-palpable. No precordial heave. Abdomen:    Obesity, soft, non-tender, with normoactive bowel sounds. No masses, no hepatosplenomegaly   Extremities: No cyanosis, clubbing or pitting edema. Vascular: 2+ radial,  dorsalis pedis and posterior tibial pulses bilaterally. Brisk carotid upstrokes without carotid bruit. Skin: Skin color, texture, turgor are normal with no rashes or ulceration. Pysch: Euthymic mood, appropriate affect   Neurologic: Oriented to person, place and time. No slurred speech or facial asymmetry. No motor or sensory deficits on gross examination.          Labs:   CBC:   Lab Results   Component Value Date/Time    WBC 12.6 08/30/2021 02:37 PM    RBC 4.51 08/30/2021 02:37 PM    RBC 4.90 08/17/2016 11:43 AM    HGB 13.4 08/30/2021 02:37 PM    HCT 41.2 08/30/2021 02:37 PM    MCV 91.4 08/30/2021 02:37 PM    RDW 13.2 08/30/2021 02:37 PM     08/30/2021 02:37 PM     CMP:  Lab Results   Component Value Date/Time     10/13/2021 12:06 PM    K 4.3 10/13/2021 12:06 PM    K 4.6 08/30/2021 02:37 PM     10/13/2021 12:06 PM    CO2 22 10/13/2021 12:06 PM    BUN 15 10/13/2021 12:06 PM    CREATININE 0.7 10/13/2021 12:06 PM    GFRAA >60 10/13/2021 12:06 PM    GFRAA >60 01/23/2013 01:50 PM    AGRATIO 1.9 08/30/2021 02:37 PM    LABGLOM >60 10/13/2021 12:06 PM    GLUCOSE 89 10/13/2021 12:06 PM    PROT 7.0 08/30/2021 02:37 PM    PROT 7.6 01/23/2013 01:50 PM    CALCIUM 9.5 10/13/2021 12:06 PM    BILITOT <0.2 08/30/2021 02:37 PM    ALKPHOS 101 08/30/2021 02:37 PM    AST 11 08/30/2021 02:37 PM    ALT 18 08/30/2021 02:37 PM     PT/INR:  No results found for: PTINR  HgBA1c:  Lab Results   Component Value Date    LABA1C 5.8 10/13/2021     Lab Results   Component Value Date    CHOL 181 2021     Lab Results   Component Value Date    TRIG 235 (H) 2021     Lab Results   Component Value Date    HDL 37 (L) 10/13/2021    HDL 32 (L) 2021     Lab Results   Component Value Date    LDLCALC 69 10/13/2021    LDLCALC 102 (H) 2021     Lab Results   Component Value Date    LABVLDL 33 10/13/2021    LABVLDL 47 2021     No results found for: CHOLHDLRATIO      No results found for: CKTOTAL, CKMB, CKMBINDEX, TROPONINI        Cardiac Data:   EKG 21 personally reviewed, notable for normal sinus rhythm with RSR', otherwise normal    EKG 21 OSH personally reviewed, notable for normal sinus rhythm with isolated PVC    EK17  Marked sinus bradycardia with sinus arrhythmia 48bpm Abnormal ECG When compared with ECG of 02-DEC-2015 18:04, Vent. rate has decreased BY  75 BPM Incomplete right bundle branch block is no longer Present Confirmed by Hien Arcos MD, [address] ([id number]) on 2017 7:26:13 AM     CT Cardiac Calcium Score 2021  FINDINGS: LEFT MAIN: Zero (0). RIGHT CORONARY ARTERY: 134  LEFT ANTERIOR DESCENDIN  CIRCUMFLEX: 7  TOTAL AGATSTON CALCIUM SCORE: Zero (0). EXTRACARDIAC STRUCTURES: No evidence of mediastinal or hilar lymphadenopathy. No pericardial effusion. No cardiomegaly. No evidence of acute process in the lungs. No noncalcified nodules are noted in the lungs. Limited images of the upper abdomen are grossly unremarkable. Visualized osseous structures demonstrate age related degenerative changes without acute abnormality. Total calcium score of 242 is within the 90th percentile for the patient's   age of 40 y/o . Impression and Plan:      1. Coronary artery disease/coronary artery calcification, 90th percentile by CAC score. No angina or clinical ASCV  2.  Obesity due to excess calories, BMI 44   3. Obstructive sleep apnea, compliant with CPAP  4. Hypertension-controlled  5.   Diabetes mellitus       Patient Active Problem List   Diagnosis    Attention deficit hyperactivity disorder (ADHD), predominantly inattentive type    HTN (hypertension)    Hyperglycemia    Family history of diabetes mellitus    Severe obstructive sleep apnea    Thrombocytosis    Obesity, Class III, BMI 40-49.9 (morbid obesity) (HCC)    Hydronephrosis with renal calculous obstruction    Kidney stone    Intractable pain    Adrenal tumor    Class 3 severe obesity with body mass index (BMI) of 40.0 to 44.9 in adult [facility])    History of adrenal adenoma    H/O partial adrenalectomy (HCC)    Hypercalciuria    Abnormal EKG    Obesity (BMI 30-39. 9)       PLAN:  Referral placed to bariatric surgery for formal evaluation for nutrition and exercise counseling, possible weight loss medication prescription as well as consideration for bariatric surgery. Continue current medications. We encouraged modest weight loss through implementing appropriate dietary measures as well as initiation of a graded exercise program with the ultimate goal of 150 minutes of aerobic exercise weekly. Plan to follow up in 6 months. Jeimy Rivas RN, am scribing for and in the presence of Dr. Loc Barahona. 08/11/22 11:27 AM       The scribes documentation has been prepared under my direction and personally reviewed by me in its entirety. I confirm that the note above accurately reflects all work, treatment, procedures, and medical decision making performed by me. Belinda Bosworth MD, personally performed the services described in this documentation as scribed by Feliciano York RN in my presence, and it is both accurate and complete to the best of our ability. I will address the patient's cardiac risk factors and adjusted pharmacologic treatment as needed. In addition, I have reinforced the need for patient directed risk factor modification. All questions and concerns were addressed to the patient/family. Alternatives to my treatment were discussed.      Thank you for allowing us to participate in the care of Jim Maldonado. Please call me with any questions [phone].     Chad Smith MD, [facility] [place]  Cardiovascular Disease  [place] [address]  [phone] [facility]  [phone] [address] [place] [address]  8/11/2022 11:25 AM

## 2022-08-11 NOTE — PATIENT INSTRUCTIONS
PLAN:  Referral to weight loss team.   Continue current medications. Discussed heart healthy diet and adequate hydration. Plan to follow up in 6 months.

## 2022-08-15 NOTE — TELEPHONE ENCOUNTER
Reviewed recent lab work scanned to chart, LDL 80, triglycerides 294 in the setting of A1c 7.2%. If we confirm this was fasting lipid profile would recommend 2 g fish oil (lovaza or OTC) twice daily. Referral placed to bariatric surgery office and we await their evaluation for him.     MARLENE

## 2022-08-22 NOTE — TELEPHONE ENCOUNTER
Spoke to spouse and said did not know if he was fasting. She will have the patient call us back to confirm.

## 2022-08-25 RX ORDER — CHLORAL HYDRATE 500 MG
1000 CAPSULE ORAL 2 TIMES DAILY
Qty: 90 CAPSULE | Refills: 0
Start: 2022-08-25

## 2022-09-21 DIAGNOSIS — E78.00 HYPERCHOLESTEREMIA: Primary | ICD-10-CM

## 2022-09-21 DIAGNOSIS — I25.10 CORONARY ARTERY DISEASE WITHOUT ANGINA PECTORIS, UNSPECIFIED VESSEL OR LESION TYPE, UNSPECIFIED WHETHER NATIVE OR TRANSPLANTED HEART: ICD-10-CM

## 2022-09-21 RX ORDER — ATORVASTATIN CALCIUM 20 MG/1
20 TABLET, FILM COATED ORAL NIGHTLY
Qty: 60 TABLET | Refills: 0 | Status: SHIPPED | OUTPATIENT
Start: 2022-09-21 | End: 2022-11-20

## 2022-09-21 NOTE — TELEPHONE ENCOUNTER
Pt is requesting refill of   atorvastatin (LIPITOR) 20 MG tablet  Preferred pharmacy is [facility] in [place]. [place] [phone]. Last ov 08/11/2022 latoya. Pt has been out for two weeks.

## 2022-11-18 DIAGNOSIS — E78.00 HYPERCHOLESTEREMIA: ICD-10-CM

## 2022-11-22 DIAGNOSIS — E78.00 HYPERCHOLESTEREMIA: ICD-10-CM

## 2022-11-22 NOTE — TELEPHONE ENCOUNTER
Medication Refill    Medication needing refilled:atorvastatin (LIPITOR) 20 MG tablet       Dosage of the medication: 20 mg     How are you taking this medication (QD, BID, TID, QID, PRN): Take 1 tablet by mouth at bedtime    30 or 90 day supply called in: 60    When will you run out of your medication: NOW     Which Pharmacy are we sending the medication to?:  [facility] [id number] - [facility] [address] [facility] [phone]   [address] [address] [place] [address] [address], [address]   Phone:  [phone]  Fax:  [phone]

## 2022-11-23 RX ORDER — ATORVASTATIN CALCIUM 20 MG/1
20 TABLET, FILM COATED ORAL NIGHTLY
Qty: 30 TABLET | Refills: 2 | Status: SHIPPED | OUTPATIENT
Start: 2022-11-23

## 2022-11-23 NOTE — TELEPHONE ENCOUNTER
11/23 called ([phone]) unable to make contact with pt. KALIN for pt to call [facility] to schedule appt.

## 2022-11-25 RX ORDER — ATORVASTATIN CALCIUM 20 MG/1
TABLET, FILM COATED ORAL
Qty: 60 TABLET | Refills: 0 | OUTPATIENT
Start: 2022-11-25

## 2023-02-25 DIAGNOSIS — E78.00 HYPERCHOLESTEREMIA: ICD-10-CM

## 2023-02-27 RX ORDER — ATORVASTATIN CALCIUM 20 MG/1
TABLET, FILM COATED ORAL
Qty: 90 TABLET | Refills: 0 | Status: SHIPPED | OUTPATIENT
Start: 2023-02-27

## 2023-04-06 ENCOUNTER — TELEPHONE (OUTPATIENT)
Dept: PULMONOLOGY | Age: 48
End: 2023-04-06

## 2023-05-26 ENCOUNTER — TELEPHONE (OUTPATIENT)
Dept: PULMONOLOGY | Age: 48
End: 2023-05-26

## 2023-05-26 DIAGNOSIS — G47.33 SEVERE OBSTRUCTIVE SLEEP APNEA: Primary | ICD-10-CM

## 2023-05-30 ENCOUNTER — OFFICE VISIT (OUTPATIENT)
Dept: ENT CLINIC | Age: 48
End: 2023-05-30
Payer: COMMERCIAL

## 2023-05-30 ENCOUNTER — PROCEDURE VISIT (OUTPATIENT)
Dept: AUDIOLOGY | Age: 48
End: 2023-05-30
Payer: COMMERCIAL

## 2023-05-30 VITALS — BODY MASS INDEX: 43.23 KG/M2 | HEIGHT: 70 IN | OXYGEN SATURATION: 98 % | WEIGHT: 302 LBS | TEMPERATURE: 98.2 F

## 2023-05-30 DIAGNOSIS — H65.91 MIDDLE EAR EFFUSION, RIGHT: ICD-10-CM

## 2023-05-30 DIAGNOSIS — H90.A11 CONDUCTIVE HEARING LOSS OF RIGHT EAR WITH RESTRICTED HEARING OF LEFT EAR: Primary | ICD-10-CM

## 2023-05-30 PROCEDURE — 31231 NASAL ENDOSCOPY DX: CPT | Performed by: OTOLARYNGOLOGY

## 2023-05-30 PROCEDURE — 92567 TYMPANOMETRY: CPT | Performed by: AUDIOLOGIST

## 2023-05-30 PROCEDURE — 92557 COMPREHENSIVE HEARING TEST: CPT | Performed by: AUDIOLOGIST

## 2023-05-30 PROCEDURE — 99203 OFFICE O/P NEW LOW 30 MIN: CPT | Performed by: OTOLARYNGOLOGY

## 2023-05-30 RX ORDER — FLUTICASONE PROPIONATE 50 MCG
1 SPRAY, SUSPENSION (ML) NASAL DAILY
COMMUNITY

## 2023-05-30 RX ORDER — SODIUM CHLORIDE/SODIUM BICARB
1 PACKET (EA) NASAL 2 TIMES DAILY
Qty: 200 EACH | Refills: 1 | Status: SHIPPED | OUTPATIENT
Start: 2023-05-30

## 2023-05-30 ASSESSMENT — ENCOUNTER SYMPTOMS
SINUS PRESSURE: 1
SHORTNESS OF BREATH: 0
ABDOMINAL PAIN: 0
RHINORRHEA: 1
SINUS PAIN: 0
WHEEZING: 0

## 2023-05-30 NOTE — PROGRESS NOTES
Gastrointestinal:  Negative for abdominal pain. Musculoskeletal:  Negative for neck pain and neck stiffness. Skin:  Negative for rash. Neurological:  Negative for dizziness, light-headedness and headaches. Hematological:  Negative for adenopathy. PhysicalExam     Vitals:    05/30/23 1355   Temp: 98.2 °F (36.8 °C)   TempSrc: Infrared   SpO2: 98%   Weight: (!) 302 lb (137 kg)   Height: 5' 10\" (1.778 m)       Physical Exam  Vitals reviewed. Constitutional:       Appearance: Normal appearance. HENT:      Head: Normocephalic and atraumatic. Right Ear: Ear canal and external ear normal. A middle ear effusion is present. There is no impacted cerumen. Left Ear: Tympanic membrane, ear canal and external ear normal. There is no impacted cerumen. Ears:      [place]amo exam findings: Does not lateralize. Right Rinne: AC > BC. Left Rinne: AC > BC. Nose: No congestion or rhinorrhea. Mouth/Throat:      Lips: Pink. No lesions. Mouth: Mucous membranes are moist. No oral lesions. Tongue: No lesions. Tongue does not deviate from midline. Palate: No mass and lesions. Pharynx: Oropharynx is clear. Uvula midline. No oropharyngeal exudate or posterior oropharyngeal erythema. Eyes:      Extraocular Movements: Extraocular movements intact. Pupils: Pupils are equal, round, and reactive to light. Musculoskeletal:      Cervical back: Normal range of motion and neck supple. Lymphadenopathy:      Cervical: No cervical adenopathy. Neurological:      Mental Status: He is alert. Cranial Nerves: No cranial nerve deficit. Data Review             Procedure     Nasal Endoscopy    Pre OP: Right middle ear effusion, rule out nasopharyngeal mass  Post OP: Nasal inflammation    Verbal consent was received.  After topical anesthesia and decongestion had been obtained using aerosolized 1% lidocaine and oxymetazoline, a 45 degree rigid endoscope was placed into both

## 2023-05-30 NOTE — PROGRESS NOTES
Daryl Valentin   1975, 41 y.o. male   [id number]       Referring Provider: Seun Hughes MD  Referral Type: In an order in [place]    Reason for Visit: Evaluation of the cause of disorders of hearing, tinnitus, or balance. ADULT AUDIOLOGIC EVALUATION      Daryl Valenitn is a 41 y.o. male seen today, 5/30/2023 , for an initial audiologic evaluation. Patient was seen by Seun Hughes MD following today's evaluation. AUDIOLOGIC AND OTHER PERTINENT MEDICAL HISTORY:      Daryl Valentin reports experiencing increased sinus and allergy concerns approximately 3 weeks ago. He states he then noticed hearing sensitivity in his right ear and dizziness, he denies true vertigo. He states he hearing has improved some over time but has not returned to normal. Patient has a history of occupational and/or recreational noise exposure. No other significant otologic history reported. He denied otalgia, otorrhea, history of falls, history of head trauma, and history of ear surgery. Date: 5/30/2023     IMPRESSIONS:      Today's results revealed a mild conductive hearing loss in the right ear. Excellent speech understanding when in quiet. Tympanometry indicates  negative pressure in the right ear. Discussed test results and implications with patient. Discussed noise exposure and the importance of appropriate hearing protection when in hazardous noise environments. Follow medical recommendations of Seun Hughes MD.     ASSESSMENT AND FINDINGS:     Otoscopy unremarkable. RIGHT EAR:  Hearing Sensitivity: A mild conductive hearing loss with a high frequency notch. Speech Recognition Threshold: 25 dB HL  Word Recognition: Excellent 100%, based on NU-6 25-word list at 60 dBHL using recorded speech stimuli. Tympanometry: Negative peak pressure with normal compliance, Type C tympanogram, consistent with ETD/history of otitis media. Volume 1.3 cm3, Peak -201 daPa, 0.69 mmho.       LEFT EAR:  Hearing Sensitivity:

## 2023-05-30 NOTE — PATIENT INSTRUCTIONS
Instructions for Sinus Rinses/Nasal Sprays  -Start hypertonic nasal saline rinses saline twice daily - use distilled water, or water that you have boiled (and that has cooled to room temperature). Use a salt packet in the sinus rinse bottle.  When performing rinses, please stand over your sink with your chin tucked into your chest. Use 4oz of the bottle in the left nasal passage, and 4oz in the right nasal passage  -Start topical nasal steroid sprays twice daily 15-20 minutes after sinus rinses - again, tuck chin to chest when using sprays and place the spray tip of the bottle in the nose, with the tip oriented toward the ceiling   -Pop ears 4-5 times daily

## 2023-11-06 DIAGNOSIS — I10 PRIMARY HYPERTENSION: ICD-10-CM

## 2023-11-06 NOTE — TELEPHONE ENCOUNTER
Last OV 8/11/22  No upcoming OV  BMP 10/13/21  CMP 8/30/21      PLAN:  Referral placed to bariatric surgery for formal evaluation for nutrition and exercise counseling, possible weight loss medication prescription as well as consideration for bariatric surgery. Continue current medications. We encouraged modest weight loss through implementing appropriate dietary measures as well as initiation of a graded exercise program with the ultimate goal of 150 minutes of aerobic exercise weekly.             Plan to follow up in 6 months    Front - please schedule pt appt

## 2023-11-10 RX ORDER — LISINOPRIL 10 MG/1
10 TABLET ORAL EVERY MORNING
Qty: 90 TABLET | Refills: 0 | Status: SHIPPED | OUTPATIENT
Start: 2023-11-10

## 2023-11-10 NOTE — TELEPHONE ENCOUNTER
11/10 (Third Attempt) Called [phone]. M for pt to call and schedule annual appt with MARLENE.  Sent Letter

## 2024-02-10 DIAGNOSIS — I10 PRIMARY HYPERTENSION: ICD-10-CM

## 2024-02-12 NOTE — TELEPHONE ENCOUNTER
2/12- called pt @[phone]. I spoke with pt. Pt stated that he'll have to call back as he doesn't have a lot of time available. Pt needs annual apt w/RJM. Will try again at a later time.

## 2024-02-12 NOTE — TELEPHONE ENCOUNTER
Last OV 8/11/22  No upcoming OV  BMP 10/13/21  CMP 8/30/21      PLAN:  Referral placed to bariatric surgery for formal evaluation for nutrition and exercise counseling, possible weight loss medication prescription as well as consideration for bariatric surgery.  Continue current medications.  We encouraged modest weight loss through implementing appropriate dietary measures as well as initiation of a graded exercise program with the ultimate goal of 150 minutes of aerobic exercise weekly.            Plan to follow up in 6 months.    Front - please schedule pt appt.

## 2024-02-14 NOTE — TELEPHONE ENCOUNTER
2/14- Second attempt. Called pt @[phone]. Unable to make contact. Unable to lvm. Received an automated msg stating that mailbox is full. Pt needs annual apt w/RJM. Will try again at a later time.

## 2024-02-16 RX ORDER — LISINOPRIL 10 MG/1
10 TABLET ORAL EVERY MORNING
Qty: 30 TABLET | Refills: 0 | Status: SHIPPED | OUTPATIENT
Start: 2024-02-16

## 2024-02-16 NOTE — TELEPHONE ENCOUNTER
2/16- Third attempt. Called pt @[phone]. Unable to make contact. Unable to lvm. Received an automated msg stating that mailbox is full. Pt needs annual apt w/RJM. Sent letter.

## 2024-03-18 DIAGNOSIS — I10 PRIMARY HYPERTENSION: ICD-10-CM

## 2024-03-18 RX ORDER — LISINOPRIL 10 MG/1
10 TABLET ORAL EVERY MORNING
Qty: 30 TABLET | Refills: 0 | Status: SHIPPED | OUTPATIENT
Start: 2024-03-18

## 2024-03-18 NOTE — TELEPHONE ENCOUNTER
Last ov- 08/11/22 rjm  Upcoming ov- none, multiple attempts to reach pt and letter sent to home for ov  Bmp- 10/13/21

## 2024-06-11 ENCOUNTER — TELEPHONE (OUTPATIENT)
Dept: PULMONOLOGY | Age: 49
End: 2024-06-11

## 2024-06-11 ENCOUNTER — TELEMEDICINE (OUTPATIENT)
Dept: SLEEP MEDICINE | Age: 49
End: 2024-06-11
Payer: COMMERCIAL

## 2024-06-11 DIAGNOSIS — E66.01 OBESITY, CLASS III, BMI 40-49.9 (MORBID OBESITY) (HCC): ICD-10-CM

## 2024-06-11 DIAGNOSIS — Z71.89 CPAP USE COUNSELING: ICD-10-CM

## 2024-06-11 DIAGNOSIS — I10 PRIMARY HYPERTENSION: ICD-10-CM

## 2024-06-11 DIAGNOSIS — G47.33 SEVERE OBSTRUCTIVE SLEEP APNEA: Primary | ICD-10-CM

## 2024-06-11 PROCEDURE — 99214 OFFICE O/P EST MOD 30 MIN: CPT | Performed by: NURSE PRACTITIONER

## 2024-06-11 RX ORDER — SITAGLIPTIN 100 MG/1
100 TABLET, FILM COATED ORAL DAILY
COMMUNITY
Start: 2024-05-31

## 2024-06-11 ASSESSMENT — SLEEP AND FATIGUE QUESTIONNAIRES
HOW LIKELY ARE YOU TO NOD OFF OR FALL ASLEEP WHILE SITTING INACTIVE IN A PUBLIC PLACE: WOULD NEVER DOZE
HOW LIKELY ARE YOU TO NOD OFF OR FALL ASLEEP WHILE SITTING AND TALKING TO SOMEONE: WOULD NEVER DOZE
HOW LIKELY ARE YOU TO NOD OFF OR FALL ASLEEP WHILE SITTING AND READING: WOULD NEVER DOZE
HOW LIKELY ARE YOU TO NOD OFF OR FALL ASLEEP WHILE LYING DOWN TO REST IN THE AFTERNOON WHEN CIRCUMSTANCES PERMIT: SLIGHT CHANCE OF DOZING
ESS TOTAL SCORE: 1
HOW LIKELY ARE YOU TO NOD OFF OR FALL ASLEEP IN A CAR, WHILE STOPPED FOR A FEW MINUTES IN TRAFFIC: WOULD NEVER DOZE
HOW LIKELY ARE YOU TO NOD OFF OR FALL ASLEEP WHILE SITTING QUIETLY AFTER LUNCH WITHOUT ALCOHOL: WOULD NEVER DOZE
HOW LIKELY ARE YOU TO NOD OFF OR FALL ASLEEP WHEN YOU ARE A PASSENGER IN A CAR FOR AN HOUR WITHOUT A BREAK: WOULD NEVER DOZE
HOW LIKELY ARE YOU TO NOD OFF OR FALL ASLEEP WHILE WATCHING TV: WOULD NEVER DOZE

## 2024-06-11 NOTE — TELEPHONE ENCOUNTER
Faxed full face mask order, CR, and ov notes to [facility] at [phone] via [employer].

## 2024-06-11 NOTE — PROGRESS NOTES
CHIEF COMPLAINT: Sleep apnea follow up  Consulting provider: Dr. Arnol TAN Alireza is a 48 y.o. male for televideo appointment via video and audio virtual visit for SAAD follow up.  States he is doing great with CPAP.  Patient is using CPAP 7-9 hrs/night. Using humidifier. No snoring on CPAP. The pressure is well tolerated. The mask is comfortable- full face F30i. No mask leak. No significant daytime sleepiness. No nodding off when driving. No dry nose or throat. No fatigue. Bedtime is 11 pm- MN and rise time is 7 am. Sleep onset is <20 minutes. Wakes up 1 times at night total. 1 nocturia. It takes few minutes to fall back a sleep. Rare naps during the day. No headache in am. No weight gain. 1 caffienated beverages during the day. Rare alcohol. ESS is 1        Past Medical History:   Diagnosis Date    Difficult airway for intubation 09/21/2020    good view glidescope, difficult to pass tube    Hypertension     Kidney stones     MRSA (methicillin resistant Staphylococcus aureus) 12/10/12    SAAD (obstructive sleep apnea)        Past Surgical History:        Procedure Laterality Date    BLADDER SURGERY Right 9/3/2021    CYSTOSCOPY, RIGHT URETEROSCOPY WITH HOLMIUM LASER LITHOTRIPSY,  RETROGRADE PYELOGRAM, URETERAL STENT INSERTION performed by Gabo Khnana MD at [facility] [place]    CYSTOSCOPY Right 9/21/2020    CYSTOSCOPY WITH STONE MANIPULIATION,RIGHT STENT PLACEMENT performed by Abel Troncoso MD at [facility] [place]    OTHER SURGICAL HISTORY  12/13/12    Wide Excision of Nonhealing Cysts Right Axilla and Bilateral Upper Inner Thighs    OTHER SURGICAL HISTORY  09/25/2013    cystoscopy, right retrograde, ureteroscopy, Holmium Laser lithotripsy, stone extraction    OTHER SURGICAL HISTORY  09/21/2020    cystoscopy with stone manipulation right stemnt placement    OTHER SURGICAL HISTORY  09/03/2021    CYSTOSCOPY, RIGHT URETEROSCOPY WITH HOLMIUM LASER LITHOTRIPSY, POSSIBLE STENT INSERTION - Right       Allergies:

## 2025-04-17 ENCOUNTER — TELEPHONE (OUTPATIENT)
Dept: PULMONOLOGY | Age: 50
End: 2025-04-17

## 2025-04-17 NOTE — TELEPHONE ENCOUNTER
** Pt Vm is full, Sent portal message to pt to R/S/ 03/20/25    *PT Vm is still full; Sent 2nd message via portal 04/07/25**       My Chart message was sent 04/17/25  Hello, This is my third attempt reaching out to you to Reschedule your appt With Manju bello. She unfortunately will be out of the office on 6/10/25. Please call our office to Reschedule. [phone].

## (undated) DEVICE — Z CONVERTED USE 2271043 CONTAINER SPEC COLL 4OZ SCR ON LID PEEL PCH

## (undated) DEVICE — FIBER LSR 200U 50W ETFE SIL FLEXSHIELD HI PWR POLISHED OUTPT

## (undated) DEVICE — CATHETER URET 5FR L70CM TIP 8FR OPN END CONE TIP INJ HUB

## (undated) DEVICE — CYSTO/BLADDER IRRIGATION SET, REGULATING CLAMP

## (undated) DEVICE — MEDI-VAC NON-CONDUCTIVE SUCTION TUBING: Brand: CARDINAL HEALTH

## (undated) DEVICE — GAUZE,SPONGE,4"X4",8PLY,STRL,LF,10/TRAY: Brand: MEDLINE

## (undated) DEVICE — DBD-PACK,CYSTOSCOPY,PK VI,AURORA: Brand: MEDLINE

## (undated) DEVICE — BAG URIN STRL FOR URO CTCH SYS

## (undated) DEVICE — PREP SOL PVP IODINE 4%  4 OZ/BTL

## (undated) DEVICE — GOWN SIRUS NONREIN LG W/TWL: Brand: MEDLINE INDUSTRIES, INC.

## (undated) DEVICE — GLOVE ORANGE PI 7 1/2   MSG9075

## (undated) DEVICE — Z DUP USE 2139333 GUIDEWIRE UROLOGICAL STR STD 0.035 IN X150 CM REG ZIPWIRE LF

## (undated) DEVICE — SOLUTION IV IRRIG 500ML 0.9% SODIUM CHL 2F7123

## (undated) DEVICE — INVIEW CLEAR LEGGINGS: Brand: CONVERTORS

## (undated) DEVICE — NITINOL STONE RETRIEVAL BASKET: Brand: ZERO TIP

## (undated) DEVICE — Z DUP USE 2522782 SOLUTION IRRIG 1000ML STRL H2O PLAS CONTAINER UROMATIC

## (undated) DEVICE — GLOVE ORANGE PI 7   MSG9070

## (undated) DEVICE — CIRCUIT ANES L72IN 3L BACT AND VIR FLTR EL CONN SGL LIMB

## (undated) DEVICE — BOWL MED L 32OZ PLAS W/ MOLD GRAD EZ OPN PEEL PCH

## (undated) DEVICE — SYRINGE MED 10ML LUERLOCK TIP W/O SFTY DISP

## (undated) DEVICE — GUIDEWIRE ENDOSCP L150CM DIA0.035IN TIP 3CM PTFE NIT

## (undated) DEVICE — OPEN-END FLEXI-TIP URETERAL CATHETER: Brand: FLEXI-TIP